# Patient Record
Sex: MALE | Race: WHITE | URBAN - METROPOLITAN AREA
[De-identification: names, ages, dates, MRNs, and addresses within clinical notes are randomized per-mention and may not be internally consistent; named-entity substitution may affect disease eponyms.]

---

## 2023-03-24 ENCOUNTER — OFFICE VISIT (OUTPATIENT)
Dept: INTERNAL MEDICINE CLINIC | Facility: CLINIC | Age: 63
End: 2023-03-24

## 2023-03-24 VITALS
BODY MASS INDEX: 33.62 KG/M2 | SYSTOLIC BLOOD PRESSURE: 155 MMHG | DIASTOLIC BLOOD PRESSURE: 85 MMHG | HEIGHT: 68 IN | WEIGHT: 221.81 LBS | HEART RATE: 73 BPM

## 2023-03-24 DIAGNOSIS — E11.9 TYPE 2 DIABETES MELLITUS WITHOUT COMPLICATION, WITHOUT LONG-TERM CURRENT USE OF INSULIN (HCC): ICD-10-CM

## 2023-03-24 DIAGNOSIS — Z12.5 PROSTATE CANCER SCREENING: ICD-10-CM

## 2023-03-24 DIAGNOSIS — R06.09 DYSPNEA ON EXERTION: ICD-10-CM

## 2023-03-24 DIAGNOSIS — E04.1 THYROID NODULE: ICD-10-CM

## 2023-03-24 DIAGNOSIS — Z00.00 PHYSICAL EXAM, ANNUAL: Primary | ICD-10-CM

## 2023-03-24 DIAGNOSIS — K42.0 UMBILICAL HERNIA WITH OBSTRUCTION, WITHOUT GANGRENE: ICD-10-CM

## 2023-03-24 DIAGNOSIS — I10 PRIMARY HYPERTENSION: ICD-10-CM

## 2023-03-24 PROCEDURE — 3008F BODY MASS INDEX DOCD: CPT | Performed by: INTERNAL MEDICINE

## 2023-03-24 PROCEDURE — 99386 PREV VISIT NEW AGE 40-64: CPT | Performed by: INTERNAL MEDICINE

## 2023-03-24 PROCEDURE — 3077F SYST BP >= 140 MM HG: CPT | Performed by: INTERNAL MEDICINE

## 2023-03-24 PROCEDURE — 3079F DIAST BP 80-89 MM HG: CPT | Performed by: INTERNAL MEDICINE

## 2023-03-24 RX ORDER — LISINOPRIL 5 MG/1
5 TABLET ORAL DAILY
Qty: 90 TABLET | Refills: 0 | Status: SHIPPED | OUTPATIENT
Start: 2023-03-24

## 2023-04-04 ENCOUNTER — LAB ENCOUNTER (OUTPATIENT)
Dept: LAB | Age: 63
End: 2023-04-04
Attending: INTERNAL MEDICINE
Payer: MEDICAID

## 2023-04-04 DIAGNOSIS — Z12.5 PROSTATE CANCER SCREENING: ICD-10-CM

## 2023-04-04 DIAGNOSIS — Z00.00 PHYSICAL EXAM, ANNUAL: ICD-10-CM

## 2023-04-04 LAB
ALBUMIN SERPL-MCNC: 3.7 G/DL (ref 3.4–5)
ALBUMIN/GLOB SERPL: 0.8 {RATIO} (ref 1–2)
ALP LIVER SERPL-CCNC: 62 U/L
ALT SERPL-CCNC: 58 U/L
ANION GAP SERPL CALC-SCNC: 6 MMOL/L (ref 0–18)
AST SERPL-CCNC: 35 U/L (ref 15–37)
BASOPHILS # BLD AUTO: 0.04 X10(3) UL (ref 0–0.2)
BASOPHILS NFR BLD AUTO: 0.7 %
BILIRUB SERPL-MCNC: 0.6 MG/DL (ref 0.1–2)
BUN BLD-MCNC: 21 MG/DL (ref 7–18)
BUN/CREAT SERPL: 20.8 (ref 10–20)
CALCIUM BLD-MCNC: 9.8 MG/DL (ref 8.5–10.1)
CHLORIDE SERPL-SCNC: 106 MMOL/L (ref 98–112)
CHOLEST SERPL-MCNC: 220 MG/DL (ref ?–200)
CO2 SERPL-SCNC: 26 MMOL/L (ref 21–32)
COMPLEXED PSA SERPL-MCNC: 0.44 NG/ML (ref ?–4)
CREAT BLD-MCNC: 1.01 MG/DL
CREAT UR-SCNC: 117 MG/DL
DEPRECATED RDW RBC AUTO: 41.3 FL (ref 35.1–46.3)
EOSINOPHIL # BLD AUTO: 0.08 X10(3) UL (ref 0–0.7)
EOSINOPHIL NFR BLD AUTO: 1.3 %
ERYTHROCYTE [DISTWIDTH] IN BLOOD BY AUTOMATED COUNT: 11.9 % (ref 11–15)
EST. AVERAGE GLUCOSE BLD GHB EST-MCNC: 154 MG/DL (ref 68–126)
FASTING PATIENT LIPID ANSWER: YES
FASTING STATUS PATIENT QL REPORTED: YES
GFR SERPLBLD BASED ON 1.73 SQ M-ARVRAT: 85 ML/MIN/1.73M2 (ref 60–?)
GLOBULIN PLAS-MCNC: 4.6 G/DL (ref 2.8–4.4)
GLUCOSE BLD-MCNC: 141 MG/DL (ref 70–99)
HBA1C MFR BLD: 7 % (ref ?–5.7)
HCT VFR BLD AUTO: 45.4 %
HDLC SERPL-MCNC: 39 MG/DL (ref 40–59)
HGB BLD-MCNC: 15.4 G/DL
IMM GRANULOCYTES # BLD AUTO: 0.01 X10(3) UL (ref 0–1)
IMM GRANULOCYTES NFR BLD: 0.2 %
LDLC SERPL CALC-MCNC: 98 MG/DL (ref ?–100)
LYMPHOCYTES # BLD AUTO: 2.4 X10(3) UL (ref 1–4)
LYMPHOCYTES NFR BLD AUTO: 39.5 %
MCH RBC QN AUTO: 31.8 PG (ref 26–34)
MCHC RBC AUTO-ENTMCNC: 33.9 G/DL (ref 31–37)
MCV RBC AUTO: 93.8 FL
MICROALBUMIN UR-MCNC: 4.28 MG/DL
MICROALBUMIN/CREAT 24H UR-RTO: 36.6 UG/MG (ref ?–30)
MONOCYTES # BLD AUTO: 0.58 X10(3) UL (ref 0.1–1)
MONOCYTES NFR BLD AUTO: 9.6 %
NEUTROPHILS # BLD AUTO: 2.96 X10 (3) UL (ref 1.5–7.7)
NEUTROPHILS # BLD AUTO: 2.96 X10(3) UL (ref 1.5–7.7)
NEUTROPHILS NFR BLD AUTO: 48.7 %
NONHDLC SERPL-MCNC: 181 MG/DL (ref ?–130)
OSMOLALITY SERPL CALC.SUM OF ELEC: 291 MOSM/KG (ref 275–295)
PLATELET # BLD AUTO: 273 10(3)UL (ref 150–450)
POTASSIUM SERPL-SCNC: 3.8 MMOL/L (ref 3.5–5.1)
PROT SERPL-MCNC: 8.3 G/DL (ref 6.4–8.2)
RBC # BLD AUTO: 4.84 X10(6)UL
SODIUM SERPL-SCNC: 138 MMOL/L (ref 136–145)
T4 FREE SERPL-MCNC: 0.9 NG/DL (ref 0.8–1.7)
TRIGL SERPL-MCNC: 496 MG/DL (ref 30–149)
TSI SER-ACNC: 3.88 MIU/ML (ref 0.36–3.74)
VLDLC SERPL CALC-MCNC: 85 MG/DL (ref 0–30)
WBC # BLD AUTO: 6.1 X10(3) UL (ref 4–11)

## 2023-04-04 PROCEDURE — 83036 HEMOGLOBIN GLYCOSYLATED A1C: CPT

## 2023-04-04 PROCEDURE — 85025 COMPLETE CBC W/AUTO DIFF WBC: CPT

## 2023-04-04 PROCEDURE — 80061 LIPID PANEL: CPT

## 2023-04-04 PROCEDURE — 3060F POS MICROALBUMINURIA REV: CPT | Performed by: INTERNAL MEDICINE

## 2023-04-04 PROCEDURE — 36415 COLL VENOUS BLD VENIPUNCTURE: CPT

## 2023-04-04 PROCEDURE — 84443 ASSAY THYROID STIM HORMONE: CPT

## 2023-04-04 PROCEDURE — 82043 UR ALBUMIN QUANTITATIVE: CPT

## 2023-04-04 PROCEDURE — 84439 ASSAY OF FREE THYROXINE: CPT

## 2023-04-04 PROCEDURE — 80053 COMPREHEN METABOLIC PANEL: CPT

## 2023-04-04 PROCEDURE — 82570 ASSAY OF URINE CREATININE: CPT

## 2023-04-04 PROCEDURE — 3061F NEG MICROALBUMINURIA REV: CPT | Performed by: INTERNAL MEDICINE

## 2023-04-04 PROCEDURE — 3051F HG A1C>EQUAL 7.0%<8.0%: CPT | Performed by: INTERNAL MEDICINE

## 2023-04-25 ENCOUNTER — HOSPITAL ENCOUNTER (OUTPATIENT)
Dept: CV DIAGNOSTICS | Facility: HOSPITAL | Age: 63
Discharge: HOME | End: 2023-04-25
Attending: INTERNAL MEDICINE
Payer: MEDICAID

## 2023-04-25 DIAGNOSIS — R06.09 DYSPNEA ON EXERTION: ICD-10-CM

## 2023-04-25 PROCEDURE — 93306 TTE W/DOPPLER COMPLETE: CPT | Performed by: INTERNAL MEDICINE

## 2023-04-27 ENCOUNTER — EKG ENCOUNTER (OUTPATIENT)
Dept: LAB | Age: 63
End: 2023-04-27
Attending: INTERNAL MEDICINE
Payer: MEDICAID

## 2023-04-27 ENCOUNTER — HOSPITAL ENCOUNTER (OUTPATIENT)
Dept: ULTRASOUND IMAGING | Age: 63
Discharge: HOME | End: 2023-04-27
Attending: INTERNAL MEDICINE
Payer: MEDICAID

## 2023-04-27 ENCOUNTER — HOSPITAL ENCOUNTER (OUTPATIENT)
Dept: GENERAL RADIOLOGY | Age: 63
Discharge: HOME | End: 2023-04-27
Attending: INTERNAL MEDICINE
Payer: MEDICAID

## 2023-04-27 DIAGNOSIS — I10 PRIMARY HYPERTENSION: ICD-10-CM

## 2023-04-27 DIAGNOSIS — E04.1 THYROID NODULE: ICD-10-CM

## 2023-04-27 DIAGNOSIS — R06.09 DYSPNEA ON EXERTION: ICD-10-CM

## 2023-04-27 LAB
ATRIAL RATE: 69 BPM
P AXIS: 64 DEGREES
P-R INTERVAL: 166 MS
Q-T INTERVAL: 398 MS
QRS DURATION: 94 MS
QTC CALCULATION (BEZET): 426 MS
R AXIS: -6 DEGREES
T AXIS: 0 DEGREES
VENTRICULAR RATE: 69 BPM

## 2023-04-27 PROCEDURE — 93010 ELECTROCARDIOGRAM REPORT: CPT | Performed by: INTERNAL MEDICINE

## 2023-04-27 PROCEDURE — 76536 US EXAM OF HEAD AND NECK: CPT | Performed by: INTERNAL MEDICINE

## 2023-04-27 PROCEDURE — 71046 X-RAY EXAM CHEST 2 VIEWS: CPT | Performed by: INTERNAL MEDICINE

## 2023-04-27 PROCEDURE — 93005 ELECTROCARDIOGRAM TRACING: CPT

## 2023-04-29 ENCOUNTER — TELEPHONE (OUTPATIENT)
Dept: INTERNAL MEDICINE CLINIC | Facility: CLINIC | Age: 63
End: 2023-04-29

## 2023-06-01 ENCOUNTER — TELEPHONE (OUTPATIENT)
Dept: INTERNAL MEDICINE CLINIC | Facility: CLINIC | Age: 63
End: 2023-06-01

## 2023-06-01 NOTE — TELEPHONE ENCOUNTER
Spoke to daughter and patient reviewed yesterday's visit note in [facility] with Dr. Rodrigo Hodge. Patient had stress test which showed reversible ischemia in the apical region of the heart. Cardiologist recommended angiography. Reviewed with patient what angiography means test to evaluate coronary arteries. And if anything abnormal found can be stented or more serious problem surgery would be recommended. Also advised that patient should be taking cholesterol-lowering medication, he agreed we will start him on the atorvastatin 20 mg daily. Recommended that patient will need to have follow-up labs in 2 months after starting atorvastatin. Also advised him to hold metformin for 2 days prior to angiography, and stay well-hydrated. Requested that patient comes for follow-up after completion of the testing.

## 2023-06-01 NOTE — TELEPHONE ENCOUNTER
Patient's daughter called that they did cardiologist consultation and the notes were suppose to be faxed over to the office.  Daughter stated that cardiologist recommended surgery and she wants to speak with Dr first

## 2023-06-03 ENCOUNTER — LAB ENCOUNTER (OUTPATIENT)
Dept: LAB | Age: 63
End: 2023-06-03
Attending: INTERNAL MEDICINE
Payer: MEDICAID

## 2023-06-03 DIAGNOSIS — Z01.818 PRE-OP TESTING: Primary | ICD-10-CM

## 2023-06-03 LAB
ANION GAP SERPL CALC-SCNC: 4 MMOL/L (ref 0–18)
BASOPHILS # BLD AUTO: 0.03 X10(3) UL (ref 0–0.2)
BASOPHILS NFR BLD AUTO: 0.5 %
BUN BLD-MCNC: 22 MG/DL (ref 7–18)
BUN/CREAT SERPL: 22.9 (ref 10–20)
CALCIUM BLD-MCNC: 9.7 MG/DL (ref 8.5–10.1)
CHLORIDE SERPL-SCNC: 107 MMOL/L (ref 98–112)
CO2 SERPL-SCNC: 28 MMOL/L (ref 21–32)
CREAT BLD-MCNC: 0.96 MG/DL
DEPRECATED RDW RBC AUTO: 43 FL (ref 35.1–46.3)
EOSINOPHIL # BLD AUTO: 0.11 X10(3) UL (ref 0–0.7)
EOSINOPHIL NFR BLD AUTO: 1.8 %
ERYTHROCYTE [DISTWIDTH] IN BLOOD BY AUTOMATED COUNT: 12.5 % (ref 11–15)
FASTING STATUS PATIENT QL REPORTED: NO
GFR SERPLBLD BASED ON 1.73 SQ M-ARVRAT: 90 ML/MIN/1.73M2 (ref 60–?)
GLUCOSE BLD-MCNC: 113 MG/DL (ref 70–99)
HCT VFR BLD AUTO: 44.8 %
HGB BLD-MCNC: 14.9 G/DL
IMM GRANULOCYTES # BLD AUTO: 0.02 X10(3) UL (ref 0–1)
IMM GRANULOCYTES NFR BLD: 0.3 %
INR BLD: 0.98 (ref 0.85–1.16)
LYMPHOCYTES # BLD AUTO: 2.21 X10(3) UL (ref 1–4)
LYMPHOCYTES NFR BLD AUTO: 35.2 %
MCH RBC QN AUTO: 31.3 PG (ref 26–34)
MCHC RBC AUTO-ENTMCNC: 33.3 G/DL (ref 31–37)
MCV RBC AUTO: 94.1 FL
MONOCYTES # BLD AUTO: 0.57 X10(3) UL (ref 0.1–1)
MONOCYTES NFR BLD AUTO: 9.1 %
NEUTROPHILS # BLD AUTO: 3.34 X10 (3) UL (ref 1.5–7.7)
NEUTROPHILS # BLD AUTO: 3.34 X10(3) UL (ref 1.5–7.7)
NEUTROPHILS NFR BLD AUTO: 53.1 %
OSMOLALITY SERPL CALC.SUM OF ELEC: 292 MOSM/KG (ref 275–295)
PLATELET # BLD AUTO: 258 10(3)UL (ref 150–450)
POTASSIUM SERPL-SCNC: 4 MMOL/L (ref 3.5–5.1)
PROTHROMBIN TIME: 12.9 SECONDS (ref 11.6–14.8)
RBC # BLD AUTO: 4.76 X10(6)UL
SODIUM SERPL-SCNC: 139 MMOL/L (ref 136–145)
WBC # BLD AUTO: 6.3 X10(3) UL (ref 4–11)

## 2023-06-03 PROCEDURE — 36415 COLL VENOUS BLD VENIPUNCTURE: CPT

## 2023-06-03 PROCEDURE — 85025 COMPLETE CBC W/AUTO DIFF WBC: CPT

## 2023-06-03 PROCEDURE — 80048 BASIC METABOLIC PNL TOTAL CA: CPT

## 2023-06-03 PROCEDURE — 85610 PROTHROMBIN TIME: CPT

## 2023-06-05 RX ORDER — ASPIRIN 81 MG/1
324 TABLET, CHEWABLE ORAL ONCE
OUTPATIENT
Start: 2023-06-05 | End: 2023-06-05

## 2023-06-05 RX ORDER — SODIUM CHLORIDE 9 MG/ML
125 INJECTION, SOLUTION INTRAVENOUS ONCE
OUTPATIENT
Start: 2023-06-05

## 2023-06-08 ENCOUNTER — PATIENT MESSAGE (OUTPATIENT)
Dept: INTERNAL MEDICINE CLINIC | Facility: CLINIC | Age: 63
End: 2023-06-08

## 2023-06-09 ENCOUNTER — HOSPITAL ENCOUNTER (OUTPATIENT)
Dept: INTERVENTIONAL RADIOLOGY/VASCULAR | Facility: HOSPITAL | Age: 63
Discharge: HOME | End: 2023-06-09
Attending: INTERNAL MEDICINE
Payer: MEDICAID

## 2023-06-12 NOTE — TELEPHONE ENCOUNTER
Anjali Downs (daughter-LAURA) called back, wants to check if the new medication was sent to the pharmacy  , informed that SITagliptin Phosphate 100 MG Oral Tab  was already  sent on 6/9/23 to [facility]. Daughter is also requesting test strips refill, patient check his blood sugar once a day. Pended prescription, routed to RN triage to run for protocol , thanks.

## 2023-06-13 ENCOUNTER — HOSPITAL ENCOUNTER (OUTPATIENT)
Dept: INTERVENTIONAL RADIOLOGY/VASCULAR | Facility: HOSPITAL | Age: 63
Discharge: HOME | End: 2023-06-13
Attending: INTERNAL MEDICINE | Admitting: INTERNAL MEDICINE
Payer: MEDICAID

## 2023-06-13 VITALS
SYSTOLIC BLOOD PRESSURE: 127 MMHG | HEIGHT: 68 IN | TEMPERATURE: 98 F | RESPIRATION RATE: 29 BRPM | BODY MASS INDEX: 32.89 KG/M2 | HEART RATE: 70 BPM | WEIGHT: 217 LBS | OXYGEN SATURATION: 93 % | DIASTOLIC BLOOD PRESSURE: 92 MMHG

## 2023-06-13 DIAGNOSIS — R06.02 SOB (SHORTNESS OF BREATH): ICD-10-CM

## 2023-06-13 DIAGNOSIS — R94.39 ABNORMAL STRESS TEST: ICD-10-CM

## 2023-06-13 LAB — GLUCOSE BLDC GLUCOMTR-MCNC: 170 MG/DL (ref 70–99)

## 2023-06-13 PROCEDURE — B2111ZZ FLUOROSCOPY OF MULTIPLE CORONARY ARTERIES USING LOW OSMOLAR CONTRAST: ICD-10-PCS | Performed by: INTERNAL MEDICINE

## 2023-06-13 PROCEDURE — 99152 MOD SED SAME PHYS/QHP 5/>YRS: CPT | Performed by: INTERNAL MEDICINE

## 2023-06-13 PROCEDURE — 92978 ENDOLUMINL IVUS OCT C 1ST: CPT | Performed by: INTERNAL MEDICINE

## 2023-06-13 PROCEDURE — 93458 L HRT ARTERY/VENTRICLE ANGIO: CPT | Performed by: INTERNAL MEDICINE

## 2023-06-13 PROCEDURE — 99153 MOD SED SAME PHYS/QHP EA: CPT | Performed by: INTERNAL MEDICINE

## 2023-06-13 PROCEDURE — 4A023N8 MEASUREMENT OF CARDIAC SAMPLING AND PRESSURE, BILATERAL, PERCUTANEOUS APPROACH: ICD-10-PCS | Performed by: INTERNAL MEDICINE

## 2023-06-13 PROCEDURE — 82962 GLUCOSE BLOOD TEST: CPT

## 2023-06-13 PROCEDURE — B240ZZ3 ULTRASONOGRAPHY OF SINGLE CORONARY ARTERY, INTRAVASCULAR: ICD-10-PCS | Performed by: INTERNAL MEDICINE

## 2023-06-13 RX ORDER — ASPIRIN 81 MG/1
324 TABLET, CHEWABLE ORAL ONCE
Status: DISCONTINUED | OUTPATIENT
Start: 2023-06-13 | End: 2023-06-13

## 2023-06-13 RX ORDER — SODIUM CHLORIDE 9 MG/ML
125 INJECTION, SOLUTION INTRAVENOUS ONCE
Status: COMPLETED | OUTPATIENT
Start: 2023-06-13 | End: 2023-06-13

## 2023-06-13 RX ORDER — HEPARIN SODIUM 1000 [USP'U]/ML
INJECTION, SOLUTION INTRAVENOUS; SUBCUTANEOUS
Status: COMPLETED
Start: 2023-06-13 | End: 2023-06-13

## 2023-06-13 RX ORDER — VERAPAMIL HYDROCHLORIDE 2.5 MG/ML
INJECTION, SOLUTION INTRAVENOUS
Status: COMPLETED
Start: 2023-06-13 | End: 2023-06-13

## 2023-06-13 RX ORDER — ASPIRIN 81 MG/1
324 TABLET, CHEWABLE ORAL ONCE
Status: ON HOLD | COMMUNITY
End: 2023-06-13

## 2023-06-13 RX ORDER — ASPIRIN 81 MG/1
TABLET, CHEWABLE ORAL
Status: COMPLETED
Start: 2023-06-13 | End: 2023-06-13

## 2023-06-13 RX ORDER — NITROGLYCERIN 20 MG/100ML
INJECTION INTRAVENOUS
Status: COMPLETED
Start: 2023-06-13 | End: 2023-06-13

## 2023-06-13 RX ORDER — MIDAZOLAM HYDROCHLORIDE 1 MG/ML
INJECTION INTRAMUSCULAR; INTRAVENOUS
Status: COMPLETED
Start: 2023-06-13 | End: 2023-06-13

## 2023-06-13 RX ORDER — ASPIRIN 81 MG/1
81 TABLET, CHEWABLE ORAL DAILY
Qty: 1 TABLET | Refills: 0 | Status: SHIPPED | OUTPATIENT
Start: 2023-06-13

## 2023-06-13 RX ORDER — LIDOCAINE HYDROCHLORIDE 20 MG/ML
INJECTION, SOLUTION EPIDURAL; INFILTRATION; INTRACAUDAL; PERINEURAL
Status: COMPLETED
Start: 2023-06-13 | End: 2023-06-13

## 2023-06-13 RX ORDER — BLOOD SUGAR DIAGNOSTIC
1 STRIP MISCELLANEOUS DAILY
Qty: 100 EACH | Refills: 3 | Status: SHIPPED | OUTPATIENT
Start: 2023-06-13

## 2023-06-13 RX ADMIN — SODIUM CHLORIDE 125 ML/HR: 9 INJECTION, SOLUTION INTRAVENOUS at 07:20:00

## 2023-06-13 RX ADMIN — ASPIRIN 324 MG: 81 TABLET, CHEWABLE ORAL at 07:30:00

## 2023-06-13 NOTE — DISCHARGE INSTRUCTIONS
Transradial Angiogram Discharge Instructions    The following instructions are for patient who have had an angiogram, cardiac cath, angioplasty, or stent through the radial artery. Proper skin puncture site care is important during the healing period. This guideline should help to remind you of the verbal instructions you received from your physician or nurse. Site: right Wrist    Site Care: For 5 days after the procedure, make sure the wrist is not submerged in water or any liquid. Leave bandage in place for 24 hours. Then, gently wash with soap and water. Do no put any other bandage, ointment, powders, or creams to the site. For local swelling: apply ice  If bleeding occurs, elevate the hand above the heart and apply local pressure    Activity/Driving:  Avoid wrist flexion, extension, and fine motor activities (i.e. Texting, typing, using a computer mouse, etc.) for 24 hours. Do not drive for 24 hours. Do not lift or pull anything heavier than 10 pounnds with affected hand for 1 week. Additional Instructions:  Do not take glucophage/metformin containing products for at least 48 hours after procedure, unless otherwise directed by your physician. When to contact your physician  Call Dr. Zahira Wilson at [phone] right away if you experience any of the following:    Swelling, pain, or bleeding at the site that is not relieved by applying ice or pressure  Signs of infection: redness, warmth, drainage at the site, chills, or temperature of 100.5 degrees or greater.   Changes in sensation, numbness, or tingling of affected hand

## 2023-06-13 NOTE — INTERVAL H&P NOTE
Pre-op Diagnosis: * No pre-op diagnosis entered *    The above referenced H&P was reviewed by Agustin Smart MD on 6/13/2023, the patient was examined and no significant changes have occurred in the patient's condition since the H&P was performed. I discussed with the patient and/or legal representative the potential benefits, risks and side effects of this procedure; the likelihood of the patient achieving goals; and potential problems that might occur during recuperation. I discussed reasonable alternatives to the procedure, including risks, benefits and side effects related to the alternatives and risks related to not receiving this procedure. We will proceed with procedure as planned.

## 2023-06-13 NOTE — IVS NOTE
DISCHARGE NOTE    1, PATIENT AWAKE AND ALERT X 4    2. MIX, VSS, NO PONV, NO PAIN    3. INSTRUCTIONS GIVEN TO PATIENT AND FAMILY    4. IV ACCESS WAS REMOVED BEFORE DISCHARGE    5. DR. Richmond        SPOKE WITH PATIENT AND FAMILY POST PROCEDURE    6. PATIENT ABLE TO DRINK, [address], VOID    7. PROCEDURAL SITE REMAINS DRY, INTACT WITH GOOD CIRCULATION,    MOVEMENT AND SENSATION    8. FOLLOW UP APPOINTMENT WITH Basilio    9. PATIENT DISCHARGED VIA WHEEL CHAIR TO [address] [place] [address]    10.  NEW PRESCRIPTION: [id number] mg

## 2023-06-13 NOTE — TELEPHONE ENCOUNTER
Please review refill failed/no protocol - diabetic supplies never sent - patient established care recently     Requested Prescriptions     Pending Prescriptions Disp Refills    Glucose Blood (ONETOUCH ULTRA) In Vitro Strip 100 each 3     Si each by Other route daily. Recent Visits  Date Type Provider Dept   23 Office Visit Claire Araya MD [facility]   Showing recent visits within past 540 days with a meds authorizing provider and meeting all other requirements  Future Appointments  No visits were found meeting these conditions. Showing future appointments within next 150 days with a meds authorizing provider and meeting all other requirements    Requested Prescriptions   Pending Prescriptions Disp Refills    Glucose Blood (ONETOUCH ULTRA) In Vitro Strip 100 each 3     Si each by Other route daily.        Diabetic Supplies Protocol Passed - 2023  1:08 PM        Passed - In person appointment or virtual visit in the past 12 mos or appointment in next 3 mos     Recent Outpatient Visits              2 months ago Physical exam, annual    [address], [address], Steve Lutz MD    Office Visit

## 2023-06-23 ENCOUNTER — TELEPHONE (OUTPATIENT)
Dept: INTERNAL MEDICINE CLINIC | Facility: CLINIC | Age: 63
End: 2023-06-23

## 2023-06-23 DIAGNOSIS — E11.9 TYPE 2 DIABETES MELLITUS WITHOUT COMPLICATION, WITHOUT LONG-TERM CURRENT USE OF INSULIN (HCC): Primary | ICD-10-CM

## 2023-07-05 ENCOUNTER — OFFICE VISIT (OUTPATIENT)
Dept: INTERNAL MEDICINE CLINIC | Facility: CLINIC | Age: 63
End: 2023-07-05

## 2023-07-05 VITALS
TEMPERATURE: 98 F | RESPIRATION RATE: 16 BRPM | BODY MASS INDEX: 32.92 KG/M2 | WEIGHT: 217.19 LBS | HEIGHT: 68 IN | HEART RATE: 97 BPM | SYSTOLIC BLOOD PRESSURE: 125 MMHG | DIASTOLIC BLOOD PRESSURE: 83 MMHG

## 2023-07-05 DIAGNOSIS — E11.9 TYPE 2 DIABETES MELLITUS WITHOUT COMPLICATION, WITHOUT LONG-TERM CURRENT USE OF INSULIN (HCC): Primary | ICD-10-CM

## 2023-07-05 DIAGNOSIS — M25.571 ARTHRALGIA OF BOTH ANKLES: ICD-10-CM

## 2023-07-05 DIAGNOSIS — M25.572 ARTHRALGIA OF BOTH ANKLES: ICD-10-CM

## 2023-07-05 DIAGNOSIS — Z12.11 COLON CANCER SCREENING: ICD-10-CM

## 2023-07-05 DIAGNOSIS — E78.49 OTHER HYPERLIPIDEMIA: ICD-10-CM

## 2023-07-05 PROCEDURE — 3074F SYST BP LT 130 MM HG: CPT | Performed by: INTERNAL MEDICINE

## 2023-07-05 PROCEDURE — 99214 OFFICE O/P EST MOD 30 MIN: CPT | Performed by: INTERNAL MEDICINE

## 2023-07-05 PROCEDURE — 3079F DIAST BP 80-89 MM HG: CPT | Performed by: INTERNAL MEDICINE

## 2023-07-05 PROCEDURE — 3008F BODY MASS INDEX DOCD: CPT | Performed by: INTERNAL MEDICINE

## 2023-07-05 RX ORDER — EMPAGLIFLOZIN 25 MG/1
1 TABLET, FILM COATED ORAL DAILY
Qty: 30 TABLET | Refills: 5 | Status: SHIPPED | OUTPATIENT
Start: 2023-07-05

## 2023-08-09 RX ORDER — EMPAGLIFLOZIN 25 MG/1
1 TABLET, FILM COATED ORAL DAILY
Qty: 90 TABLET | Refills: 2 | Status: SHIPPED | OUTPATIENT
Start: 2023-08-09

## 2023-08-09 RX ORDER — LISINOPRIL 5 MG/1
5 TABLET ORAL DAILY
Qty: 90 TABLET | Refills: 2 | Status: SHIPPED | OUTPATIENT
Start: 2023-08-09

## 2023-08-09 RX ORDER — BLOOD SUGAR DIAGNOSTIC
1 STRIP MISCELLANEOUS DAILY
Qty: 100 EACH | Refills: 3 | Status: SHIPPED | OUTPATIENT
Start: 2023-08-09

## 2023-08-09 NOTE — TELEPHONE ENCOUNTER
Refill passed per [facility] [place] protocol. Requested Prescriptions   Pending Prescriptions Disp Refills    lisinopril 5 MG Oral Tab 90 tablet 0     Sig: Take 1 tablet (5 mg total) by mouth daily. Hypertensive Medications Protocol Passed - 8/8/2023  6:03 PM        Passed - In person appointment in the past 12 or next 3 months     Recent Outpatient Visits              1 month ago Type 2 diabetes mellitus without complication, without long-term current use of insulin ([address]    James Mcduffie Gwynneth Bearded, MD    Office Visit    4 months ago Physical exam, annual    James Mcduffie Gwynneth Bearded, MD    Office Visit          Future Appointments         Provider Department Appt Notes    In 2 months Kylee Garcia MD [facility], [address], [place] athralgia of both ankles, policy informed               Passed - Last BP reading less than 140/90     BP Readings from Last 1 Encounters:  07/05/23 : 125/83              Passed - CMP or BMP in past 6 months     Recent Results (from the past 4392 hour(s))   BASIC METABOLIC PANEL (8)    Collection Time: 06/03/23 11:21 AM   Result Value Ref Range    Glucose 113 (H) 70 - 99 mg/dL    Sodium 139 136 - 145 mmol/L    Potassium 4.0 3.5 - 5.1 mmol/L    Chloride 107 98 - 112 mmol/L    CO2 28.0 21.0 - 32.0 mmol/L    Anion Gap 4 0 - 18 mmol/L    BUN 22 (H) 7 - 18 mg/dL    Creatinine 0.96 0.70 - 1.30 mg/dL    BUN/CREA Ratio 22.9 (H) 10.0 - 20.0    Calcium, Total 9.7 8.5 - 10.1 mg/dL    Calculated Osmolality 292 275 - 295 mOsm/kg    eGFR-Cr 90 >=60 mL/min/1.73m2    Patient Fasting for BMP? No      *Note: Due to a large number of results and/or encounters for the requested time period, some results have not been displayed. A complete set of results can be found in Results Review.                Passed - In person appointment or virtual visit in the past 6 months     Recent Outpatient Visits              1 month ago Type 2 diabetes mellitus without complication, without long-term current use of insulin ([facility])    Cha Coelho [facility] Carmenza Rodriguez MD    Office Visit    4 months ago Physical exam, annual    James Mcduffie Gwynneth Bearded, MD    Office Visit          Future Appointments         Provider Department Appt Notes    In 2 months Kylee Garcia MD [facility], [address], [place] athralgia of both ankles, policy informed               Passed - EGFRCR or GFRNAA > 50     GFR Evaluation  EGFRCR: 90 , resulted on 6/3/2023            Empagliflozin (JARDIANCE) 25 MG Oral Tab 30 tablet 5     Sig: Take 1 tablet by mouth daily.        Diabetes Medication Protocol Passed - 8/8/2023  6:03 PM        Passed - Last A1C < 7.5 and within past 6 months     Lab Results   Component Value Date    A1C 7.0 (H) 04/04/2023             Passed - In person appointment or virtual visit in the past 6 mos or appointment in next 3 mos     Recent Outpatient Visits              1 month ago Type 2 diabetes mellitus without complication, without long-term current use of insulin ([facility]    Cha Coelho, [facility] Carmenza Fernando MD    Office Visit    4 months ago Physical exam, annual    James Mcduffie Gwynneth Bearded, MD    Office Visit          Future Appointments         Provider Department Appt Notes    In 2 months MD Александр [facility], [address], [place] athralgia of both ankles, policy informed               Passed - EGFRCR or GFRNAA > 50     GFR Evaluation  EGFRCR: 90 , resulted on 6/3/2023          Passed - GFR in the past 12 months

## 2023-08-09 NOTE — TELEPHONE ENCOUNTER
Refill passed per [place] [email or web address] [facility] protocol. Requested Prescriptions   Pending Prescriptions Disp Refills    Glucose Blood (ONETOUCH ULTRA) In Vitro Strip 100 each 3     Si each by Other route daily.        Diabetic Supplies Protocol Passed - 2023  6:03 PM        Passed - In person appointment or virtual visit in the past 12 mos or appointment in next 3 mos     Recent Outpatient Visits              1 month ago Type 2 diabetes mellitus without complication, without long-term current use of insulin ([facility]    [address], MD    Office Visit    4 months ago Physical exam, annual    [address], Kraig Mcknight MD    Office Visit          Future Appointments         Provider Department Appt Notes    In 2 months Radha Connor MD [address], [place] athralgia of both ankles, policy informed                    Recent Outpatient Visits              1 month ago Type 2 diabetes mellitus without complication, without long-term current use of insulin ([facility] [address]    [address], MD    Office Visit    4 months ago Physical exam, annual    [address], MD    Office Visit            Future Appointments         Provider Department Appt Notes    In 2 months Radha Connor MD [facility], [address], [place] athralgia of both ankles, policy informed

## 2023-09-11 RX ORDER — ATORVASTATIN CALCIUM 20 MG/1
20 TABLET, FILM COATED ORAL NIGHTLY
Qty: 90 TABLET | Refills: 3 | Status: SHIPPED | OUTPATIENT
Start: 2023-09-11

## 2023-09-11 NOTE — TELEPHONE ENCOUNTER
Refill passed per [place] [employer] protocol. Requested Prescriptions   Pending Prescriptions Disp Refills    atorvastatin 20 MG Oral Tab 90 tablet 0     Sig: Take 1 tablet (20 mg total) by mouth nightly.        Cholesterol Medication Protocol Passed - 9/11/2023  8:33 AM        Passed - ALT in past 12 months        Passed - LDL in past 12 months        Passed - Last ALT < 80     Lab Results   Component Value Date    ALT 58 04/04/2023             Passed - Last LDL < 130     Lab Results   Component Value Date    LDL 98 04/04/2023             Passed - In person appointment or virtual visit in the past 12 mos or appointment in next 3 mos     Recent Outpatient Visits              2 months ago Type 2 diabetes mellitus without complication, without long-term current use of insulin ([address]    [address],[place] [address], [address] [place] [address], Shelly Barboza MD    Office Visit    5 months ago Physical exam, annual    [address], Shelly Barboza MD    Office Visit          Future Appointments         Provider Department Appt Notes    In 1 month Lopez Ge MD [address], [address], [place] athralgia of both ankles, policy informed                     Future Appointments         Provider Department Appt Notes    In 1 month Lopez Ge MD [address], [address], [place] athralgia of both ankles, policy informed            Recent Outpatient Visits              2 months ago Type 2 diabetes mellitus without complication, without long-term current use of insulin ([address]    [address] [facility], Shelly Barboza MD    Office Visit    5 months ago Physical exam, annual    [address] [facility], Shelly Barboza MD    Office Visit

## 2023-09-20 NOTE — TELEPHONE ENCOUNTER
Refill passed per [facility] protocol. Requested Prescriptions   Pending Prescriptions Disp Refills    SITagliptin Phosphate 100 MG Oral Tab 90 tablet 1     Sig: Take 1 tablet (100 mg total) by mouth daily.        Diabetes Medication Protocol Passed - 9/19/2023  3:20 PM        Passed - Last A1C < 7.5 and within past 6 months     Lab Results   Component Value Date    A1C 7.0 (H) 04/04/2023             Passed - In person appointment or virtual visit in the past 6 mos or appointment in next 3 mos     Recent Outpatient Visits              2 months ago Type 2 diabetes mellitus without complication, without long-term current use of insulin ([address]    [facility]    Office Visit    6 months ago Physical exam, annual    James Lentz Rabon Cower, MD    Office Visit          Future Appointments         Provider Department Appt Notes    In 2 weeks Surjit Mott MD [facility], [address], [place] athralgia of both ankles, policy informed                    Passed - EGFRCR or GFRNAA > 50     GFR Evaluation  EGFRCR: 90 , resulted on 6/3/2023          Passed - GFR in the past 12 months              Future Appointments         Provider Department Appt Notes    In 2 weeks MD Indira Barlow [address], [place] athralgia of both ankles, policy informed            Recent Outpatient Visits              2 months ago Type 2 diabetes mellitus without complication, without long-term current use of insulin ([address]    [address]ent[address], [place] David Wang MD    Office Visit    6 months ago Physical exam, annual    James Lentz Kelly [place], [place], MD    Office Visit

## 2023-10-05 ENCOUNTER — OFFICE VISIT (OUTPATIENT)
Dept: RHEUMATOLOGY | Facility: CLINIC | Age: 63
End: 2023-10-05

## 2023-10-05 ENCOUNTER — LAB ENCOUNTER (OUTPATIENT)
Dept: LAB | Age: 63
End: 2023-10-05
Attending: INTERNAL MEDICINE
Payer: MEDICAID

## 2023-10-05 VITALS
RESPIRATION RATE: 16 BRPM | WEIGHT: 203.5 LBS | HEART RATE: 81 BPM | SYSTOLIC BLOOD PRESSURE: 110 MMHG | BODY MASS INDEX: 30.84 KG/M2 | HEIGHT: 68 IN | DIASTOLIC BLOOD PRESSURE: 65 MMHG

## 2023-10-05 DIAGNOSIS — M79.10 MYALGIA: ICD-10-CM

## 2023-10-05 DIAGNOSIS — M25.571 ARTHRALGIA OF BOTH ANKLES: ICD-10-CM

## 2023-10-05 DIAGNOSIS — E78.49 OTHER HYPERLIPIDEMIA: ICD-10-CM

## 2023-10-05 DIAGNOSIS — M25.50 POLYARTHRALGIA: Primary | ICD-10-CM

## 2023-10-05 DIAGNOSIS — M25.572 ARTHRALGIA OF BOTH ANKLES: ICD-10-CM

## 2023-10-05 DIAGNOSIS — M25.50 POLYARTHRALGIA: ICD-10-CM

## 2023-10-05 DIAGNOSIS — E11.9 TYPE 2 DIABETES MELLITUS WITHOUT COMPLICATION, WITHOUT LONG-TERM CURRENT USE OF INSULIN (HCC): ICD-10-CM

## 2023-10-05 DIAGNOSIS — M11.9 CRYSTAL ARTHROPATHY OF ANKLE AND FOOT: ICD-10-CM

## 2023-10-05 LAB
ALBUMIN SERPL-MCNC: 3.9 G/DL (ref 3.4–5)
ALBUMIN/GLOB SERPL: 0.8 {RATIO} (ref 1–2)
ALP LIVER SERPL-CCNC: 65 U/L
ALT SERPL-CCNC: 51 U/L
ANION GAP SERPL CALC-SCNC: 7 MMOL/L (ref 0–18)
AST SERPL-CCNC: 51 U/L (ref 15–37)
BASOPHILS # BLD AUTO: 0.04 X10(3) UL (ref 0–0.2)
BASOPHILS NFR BLD AUTO: 0.5 %
BILIRUB SERPL-MCNC: 0.7 MG/DL (ref 0.1–2)
BUN BLD-MCNC: 24 MG/DL (ref 7–18)
BUN/CREAT SERPL: 20 (ref 10–20)
CALCIUM BLD-MCNC: 9.4 MG/DL (ref 8.5–10.1)
CHLORIDE SERPL-SCNC: 107 MMOL/L (ref 98–112)
CHOLEST SERPL-MCNC: 156 MG/DL (ref ?–200)
CK SERPL-CCNC: 1198 U/L
CO2 SERPL-SCNC: 25 MMOL/L (ref 21–32)
CREAT BLD-MCNC: 1.2 MG/DL
CRP SERPL-MCNC: 0.64 MG/DL (ref ?–0.3)
DEPRECATED RDW RBC AUTO: 39.6 FL (ref 35.1–46.3)
EGFRCR SERPLBLD CKD-EPI 2021: 69 ML/MIN/1.73M2 (ref 60–?)
EOSINOPHIL # BLD AUTO: 0.1 X10(3) UL (ref 0–0.7)
EOSINOPHIL NFR BLD AUTO: 1.3 %
ERYTHROCYTE [DISTWIDTH] IN BLOOD BY AUTOMATED COUNT: 12.1 % (ref 11–15)
ERYTHROCYTE [SEDIMENTATION RATE] IN BLOOD: 52 MM/HR
FASTING PATIENT LIPID ANSWER: NO
FASTING STATUS PATIENT QL REPORTED: NO
GLOBULIN PLAS-MCNC: 4.6 G/DL (ref 2.8–4.4)
GLUCOSE BLD-MCNC: 161 MG/DL (ref 70–99)
HCT VFR BLD AUTO: 46.9 %
HDLC SERPL-MCNC: 42 MG/DL (ref 40–59)
HGB BLD-MCNC: 15.8 G/DL
IMM GRANULOCYTES # BLD AUTO: 0.02 X10(3) UL (ref 0–1)
IMM GRANULOCYTES NFR BLD: 0.3 %
LDLC SERPL CALC-MCNC: 66 MG/DL (ref ?–100)
LYMPHOCYTES # BLD AUTO: 1.85 X10(3) UL (ref 1–4)
LYMPHOCYTES NFR BLD AUTO: 24.1 %
MCH RBC QN AUTO: 30 PG (ref 26–34)
MCHC RBC AUTO-ENTMCNC: 33.7 G/DL (ref 31–37)
MCV RBC AUTO: 89.2 FL
MONOCYTES # BLD AUTO: 0.64 X10(3) UL (ref 0.1–1)
MONOCYTES NFR BLD AUTO: 8.3 %
NEUTROPHILS # BLD AUTO: 5.02 X10 (3) UL (ref 1.5–7.7)
NEUTROPHILS # BLD AUTO: 5.02 X10(3) UL (ref 1.5–7.7)
NEUTROPHILS NFR BLD AUTO: 65.5 %
NONHDLC SERPL-MCNC: 114 MG/DL (ref ?–130)
OSMOLALITY SERPL CALC.SUM OF ELEC: 296 MOSM/KG (ref 275–295)
PLATELET # BLD AUTO: 260 10(3)UL (ref 150–450)
POTASSIUM SERPL-SCNC: 3.7 MMOL/L (ref 3.5–5.1)
PROT SERPL-MCNC: 8.5 G/DL (ref 6.4–8.2)
RBC # BLD AUTO: 5.26 X10(6)UL
RHEUMATOID FACT SERPL-ACNC: <10 IU/ML (ref ?–15)
SODIUM SERPL-SCNC: 139 MMOL/L (ref 136–145)
TRIGL SERPL-MCNC: 306 MG/DL (ref 30–149)
TSI SER-ACNC: 1.19 MIU/ML (ref 0.36–3.74)
URATE SERPL-MCNC: 6.3 MG/DL
VLDLC SERPL CALC-MCNC: 46 MG/DL (ref 0–30)
WBC # BLD AUTO: 7.7 X10(3) UL (ref 4–11)

## 2023-10-05 PROCEDURE — 82085 ASSAY OF ALDOLASE: CPT

## 2023-10-05 PROCEDURE — 80061 LIPID PANEL: CPT

## 2023-10-05 PROCEDURE — 85652 RBC SED RATE AUTOMATED: CPT

## 2023-10-05 PROCEDURE — 86431 RHEUMATOID FACTOR QUANT: CPT

## 2023-10-05 PROCEDURE — 86200 CCP ANTIBODY: CPT

## 2023-10-05 PROCEDURE — 86140 C-REACTIVE PROTEIN: CPT

## 2023-10-05 PROCEDURE — 36415 COLL VENOUS BLD VENIPUNCTURE: CPT

## 2023-10-05 PROCEDURE — 99244 OFF/OP CNSLTJ NEW/EST MOD 40: CPT | Performed by: INTERNAL MEDICINE

## 2023-10-05 PROCEDURE — 3074F SYST BP LT 130 MM HG: CPT | Performed by: INTERNAL MEDICINE

## 2023-10-05 PROCEDURE — 86038 ANTINUCLEAR ANTIBODIES: CPT

## 2023-10-05 PROCEDURE — 84443 ASSAY THYROID STIM HORMONE: CPT

## 2023-10-05 PROCEDURE — 3008F BODY MASS INDEX DOCD: CPT | Performed by: INTERNAL MEDICINE

## 2023-10-05 PROCEDURE — 83036 HEMOGLOBIN GLYCOSYLATED A1C: CPT

## 2023-10-05 PROCEDURE — 82550 ASSAY OF CK (CPK): CPT

## 2023-10-05 PROCEDURE — 84550 ASSAY OF BLOOD/URIC ACID: CPT

## 2023-10-05 PROCEDURE — 85025 COMPLETE CBC W/AUTO DIFF WBC: CPT

## 2023-10-05 PROCEDURE — 80053 COMPREHEN METABOLIC PANEL: CPT

## 2023-10-05 PROCEDURE — 3078F DIAST BP <80 MM HG: CPT | Performed by: INTERNAL MEDICINE

## 2023-10-05 RX ORDER — COLCHICINE 0.6 MG/1
0.6 TABLET ORAL DAILY PRN
Qty: 30 TABLET | Refills: 1 | Status: SHIPPED | OUTPATIENT
Start: 2023-10-05

## 2023-10-05 NOTE — PATIENT INSTRUCTIONS
Check labs   2,  try colchicine 0.6mg a day when the pain starts   3. Return to clinic in 4 weeks.  - 8 mary 12/7 at 2:10 pm

## 2023-10-06 LAB
CCP IGG SERPL-ACNC: 3.4 U/ML (ref 0–6.9)
EST. AVERAGE GLUCOSE BLD GHB EST-MCNC: 146 MG/DL (ref 68–126)
HBA1C MFR BLD: 6.7 % (ref ?–5.7)

## 2023-10-07 ENCOUNTER — TELEPHONE (OUTPATIENT)
Dept: RHEUMATOLOGY | Facility: CLINIC | Age: 63
End: 2023-10-07

## 2023-10-07 NOTE — TELEPHONE ENCOUNTER
90 day prescription request for      colchicine 0.6 MG Oral Tab, Take 1 tablet (0.6 mg total) by mouth daily as needed. , Disp: 30 tablet, Rfl: 1

## 2023-10-07 NOTE — TELEPHONE ENCOUNTER
Request sent to soon. Outpatient Medication Detail     Disp Refills Start End    colchicine 0.6 MG Oral Tab 30 tablet 1 10/5/2023     Sig - Route: Take 1 tablet (0.6 mg total) by mouth daily as needed.  - Oral    Sent to pharmacy as: Colchicine 0.6 MG Oral Tablet    E-Prescribing Status: Receipt confirmed by pharmacy (10/6/2023  6:24 PM CDT)    Prior authorization: Payer Waiting for Response      Associated Diagnoses    Crystal arthropathy of ankle and foot        Pharmacy    [address] Munira Gonzalez [facility] [address] [place] [address], [phone], [phone]

## 2023-10-09 LAB
ALDOLASE: 9.3 U/L
NUCLEAR IGG TITR SER IF: NEGATIVE {TITER}

## 2023-10-20 ENCOUNTER — LAB ENCOUNTER (OUTPATIENT)
Dept: LAB | Age: 63
End: 2023-10-20
Attending: INTERNAL MEDICINE

## 2023-10-20 DIAGNOSIS — R77.9 ELEVATED BLOOD PROTEIN: ICD-10-CM

## 2023-10-20 DIAGNOSIS — R74.8 ELEVATED CK: ICD-10-CM

## 2023-10-20 PROCEDURE — 83516 IMMUNOASSAY NONANTIBODY: CPT

## 2023-10-20 PROCEDURE — 82784 ASSAY IGA/IGD/IGG/IGM EACH: CPT

## 2023-10-20 PROCEDURE — 36415 COLL VENOUS BLD VENIPUNCTURE: CPT

## 2023-10-20 PROCEDURE — 86235 NUCLEAR ANTIGEN ANTIBODY: CPT

## 2023-10-20 PROCEDURE — 84165 PROTEIN E-PHORESIS SERUM: CPT

## 2023-10-20 PROCEDURE — 86334 IMMUNOFIX E-PHORESIS SERUM: CPT

## 2023-10-23 LAB
ALBUMIN SERPL ELPH-MCNC: 4.65 G/DL (ref 3.75–5.21)
ALBUMIN/GLOB SERPL: 1.18 {RATIO} (ref 1–2)
ALPHA1 GLOB SERPL ELPH-MCNC: 0.26 G/DL (ref 0.19–0.46)
ALPHA2 GLOB SERPL ELPH-MCNC: 0.66 G/DL (ref 0.48–1.05)
B-GLOBULIN SERPL ELPH-MCNC: 1.24 G/DL (ref 0.68–1.23)
GAMMA GLOB SERPL ELPH-MCNC: 1.79 G/DL (ref 0.62–1.7)
IGA SERPL-MCNC: 621 MG/DL (ref 70–312)
IGM SERPL-MCNC: 159 MG/DL (ref 43–279)
IMMUNOGLOBULIN PNL SER-MCNC: 1690 MG/DL (ref 791–1643)
PROT SERPL-MCNC: 8.6 G/DL (ref 6.4–8.2)

## 2023-10-27 RX ORDER — EMPAGLIFLOZIN 25 MG/1
1 TABLET, FILM COATED ORAL DAILY
Qty: 90 TABLET | Refills: 2 | OUTPATIENT
Start: 2023-10-27

## 2023-10-27 RX ORDER — LISINOPRIL 5 MG/1
5 TABLET ORAL DAILY
Qty: 90 TABLET | Refills: 2 | OUTPATIENT
Start: 2023-10-27

## 2023-10-27 NOTE — TELEPHONE ENCOUNTER
Current Outpatient Medications:     allopurinol 300 MG Oral Tab, Take 1 tablet (300 mg total) by mouth daily., Disp: 30 tablet, Rfl: 1

## 2023-10-30 NOTE — TELEPHONE ENCOUNTER
LOV: 10/5/23  Wilbert Patiño is a 63 year old male who presents for Patient presents with:  Consult: Arthralgia of both ankles on and off with swelling     Oligaoarthritis - ankles and 1st teoes  - intermittent -   - suggestive of possibly gout with 1st toe involvement - suggestive of gouy or crystal arthropathy  - Check labs to evaluate for inflammatory arthritis and/or connective tissue disease   Rtc in 4-8 weeks   D/w him to try coclhicine when the pain starts and see if he responds to it   If at time he comes in with swelling, can try to aspirate at that time.      2. Neuropathy of bilateral feet - possibly from his diabetes      Summary:  Check labs   2,  try colchicine 0.6mg a day when the pain starts   3. Return to clinic in 4 weeks. - Martina hernandez 12/7 at 2:10 pm         Speedy Gardner MD  10/5/2023  3:53 PM      Future Appointments   Date Time Provider Department Center   12/7/2023  2:10 PM Speedy Gardner MD [facility] [place]     Labs:  Component      Latest Ref Rng 10/5/2023   C-Citrullinated Peptide IgG AB      0.0 - 6.9 U/mL 3.4    MICHAEL SCREEN WITH REFLEX (S)      Negative  Negative    CK      39 - 308 U/L 1,198 (H)    Aldolase      3.3 - 10.3 U/L 9.3       Legend:  (H) High

## 2023-10-31 RX ORDER — ALLOPURINOL 300 MG/1
300 TABLET ORAL DAILY
Qty: 30 TABLET | Refills: 1 | OUTPATIENT
Start: 2023-10-31

## 2023-11-03 LAB
ANTI-EJ: NEGATIVE
ANTI-JO-1: <20 UNITS
ANTI-KU: NEGATIVE
ANTI-MDA-5: <20 UNITS
ANTI-MI-2 AB: NEGATIVE
ANTI-NXP-2: <20 UNITS
ANTI-OJ: NEGATIVE
ANTI-PL-12: NEGATIVE
ANTI-PL-7: NEGATIVE
ANTI-PM/SCL100: <20 UNITS
ANTI-SAE1: <20 UNITS
ANTI-SRP AB: NEGATIVE
ANTI-SSA 52KD IGG: <20 UNITS
ANTI-TIF-1GAMMA: <20 UNITS
ANTI-U1 RNP: <20 UNITS
ANTI-U2 RNP: NEGATIVE
ANTI-U3 RNP: NEGATIVE

## 2023-12-07 ENCOUNTER — OFFICE VISIT (OUTPATIENT)
Dept: RHEUMATOLOGY | Facility: CLINIC | Age: 63
End: 2023-12-07
Payer: MEDICAID

## 2023-12-07 ENCOUNTER — LAB ENCOUNTER (OUTPATIENT)
Dept: LAB | Age: 63
End: 2023-12-07
Attending: INTERNAL MEDICINE
Payer: MEDICAID

## 2023-12-07 VITALS — RESPIRATION RATE: 16 BRPM | WEIGHT: 210 LBS | BODY MASS INDEX: 31.83 KG/M2 | HEIGHT: 68 IN

## 2023-12-07 DIAGNOSIS — Z51.81 MEDICATION MONITORING ENCOUNTER: ICD-10-CM

## 2023-12-07 DIAGNOSIS — M10.9 GOUT, UNSPECIFIED CAUSE, UNSPECIFIED CHRONICITY, UNSPECIFIED SITE: ICD-10-CM

## 2023-12-07 DIAGNOSIS — R74.8 ELEVATED CK: ICD-10-CM

## 2023-12-07 DIAGNOSIS — M79.10 MYALGIA: ICD-10-CM

## 2023-12-07 DIAGNOSIS — M10.9 GOUT, UNSPECIFIED CAUSE, UNSPECIFIED CHRONICITY, UNSPECIFIED SITE: Primary | ICD-10-CM

## 2023-12-07 LAB
ALBUMIN SERPL-MCNC: 4.5 G/DL (ref 3.2–4.8)
ALP LIVER SERPL-CCNC: 71 U/L
ALT SERPL-CCNC: 34 U/L
AST SERPL-CCNC: 34 U/L (ref ?–34)
BILIRUB DIRECT SERPL-MCNC: 0.2 MG/DL (ref ?–0.3)
BILIRUB SERPL-MCNC: 0.8 MG/DL (ref 0.2–1.1)
CK SERPL-CCNC: 461 U/L
ERYTHROCYTE [SEDIMENTATION RATE] IN BLOOD: 34 MM/HR
PROT SERPL-MCNC: 8.2 G/DL (ref 5.7–8.2)
URATE SERPL-MCNC: 6.4 MG/DL

## 2023-12-07 PROCEDURE — 80076 HEPATIC FUNCTION PANEL: CPT

## 2023-12-07 PROCEDURE — 99214 OFFICE O/P EST MOD 30 MIN: CPT | Performed by: INTERNAL MEDICINE

## 2023-12-07 PROCEDURE — 85652 RBC SED RATE AUTOMATED: CPT

## 2023-12-07 PROCEDURE — 36415 COLL VENOUS BLD VENIPUNCTURE: CPT

## 2023-12-07 PROCEDURE — 84550 ASSAY OF BLOOD/URIC ACID: CPT

## 2023-12-07 PROCEDURE — 3008F BODY MASS INDEX DOCD: CPT | Performed by: INTERNAL MEDICINE

## 2023-12-07 PROCEDURE — 82550 ASSAY OF CK (CPK): CPT

## 2023-12-07 RX ORDER — ALLOPURINOL 300 MG/1
300 TABLET ORAL DAILY
Qty: 90 TABLET | Refills: 1 | Status: SHIPPED | OUTPATIENT
Start: 2023-12-07

## 2023-12-15 RX ORDER — BLOOD SUGAR DIAGNOSTIC
1 STRIP MISCELLANEOUS DAILY
Qty: 100 EACH | Refills: 3 | OUTPATIENT
Start: 2023-12-15

## 2023-12-29 RX ORDER — ATORVASTATIN CALCIUM 20 MG/1
20 TABLET, FILM COATED ORAL NIGHTLY
Qty: 90 TABLET | Refills: 3 | OUTPATIENT
Start: 2023-12-29

## 2024-01-25 RX ORDER — LISINOPRIL 5 MG/1
5 TABLET ORAL DAILY
Qty: 90 TABLET | Refills: 2 | Status: SHIPPED | OUTPATIENT
Start: 2024-01-25

## 2024-01-25 RX ORDER — EMPAGLIFLOZIN 25 MG/1
1 TABLET, FILM COATED ORAL DAILY
Qty: 90 TABLET | Refills: 2 | Status: SHIPPED | OUTPATIENT
Start: 2024-01-25

## 2024-01-25 NOTE — TELEPHONE ENCOUNTER
Please review; protocol failed/ has no protocol    Message sent for patient to make an appointment.     Requested Prescriptions   Pending Prescriptions Disp Refills    lisinopril 5 MG Oral Tab 90 tablet 2     Sig: Take 1 tablet (5 mg total) by mouth daily.       Hypertensive Medications Protocol Failed - 1/24/2024  6:30 PM        Failed - In person appointment or virtual visit in the past 6 months     Recent Outpatient Visits              1 month ago Gout, unspecified cause, unspecified chronicity, unspecified site    [facility] [place] Palaniswami, Purani, MD    Office Visit    3 months ago Polyarthralgia    [facility] [place] Palaniswami, Purani, MD    Office Visit    6 months ago Type 2 diabetes mellitus without complication, without long-term current use of insulin ([facility])    [facility] [place] Kandel, Ninel, MD    Office Visit    10 months ago Physical exam, annual    [facility], [place] Kandel, Ninel, MD    Office Visit                      Passed - In person appointment in the past 12 or next 3 months     Recent Outpatient Visits              1 month ago Gout, unspecified cause, unspecified chronicity, unspecified site    [facility] [place] Palaniswami, Purani, MD    Office Visit    3 months ago Polyarthralgia    [facility], [place] Palaniswami, Purani, MD    Office Visit    6 months ago Type 2 diabetes mellitus without complication, without long-term current use of insulin ([facility])    [facility], [place] Kandel, Ninel, MD    Office Visit    10 months ago Physical exam, annual    [facility], [place] Kandel, Ninel, MD    Office Visit                      Passed - Last BP reading less than 140/90     BP Readings from Last 1 Encounters:   10/05/23 110/65                Passed - CMP or BMP in past 6 months     Recent Results (from the past 4392 hour(s))   Comp Metabolic Panel (14)    Collection Time: 10/05/23  4:44 PM   Result Value Ref Range    Glucose 161 (H) 70 - 99 mg/dL    Sodium 139 136 - 145 mmol/L    Potassium 3.7 3.5 - 5.1 mmol/L    Chloride 107 98 - 112 mmol/L    CO2 25.0 21.0 - 32.0 mmol/L    Anion Gap 7 0 - 18 mmol/L    BUN 24 (H) 7 - 18 mg/dL    Creatinine 1.20 0.70 - 1.30 mg/dL    BUN/CREA Ratio 20.0 10.0 - 20.0    Calcium, Total 9.4 8.5 - 10.1 mg/dL    Calculated Osmolality 296 (H) 275 - 295 mOsm/kg    eGFR-Cr 69 >=60 mL/min/1.73m2    ALT 51 16 - 61 U/L    AST 51 (H) 15 - 37 U/L    Alkaline Phosphatase 65 45 - 117 U/L    Bilirubin, Total 0.7 0.1 - 2.0 mg/dL    Total Protein 8.5 (H) 6.4 - 8.2 g/dL    Albumin 3.9 3.4 - 5.0 g/dL    Globulin  4.6 (H) 2.8 - 4.4 g/dL    A/G Ratio 0.8 (L) 1.0 - 2.0    Patient Fasting for CMP? No      *Note: Due to a large number of results and/or encounters for the requested time period, some results have not been displayed. A complete set of results can be found in Results Review.               Passed - EGFRCR or GFRNAA > 50     GFR Evaluation  EGFRCR: 69 , resulted on 10/5/2023            Empagliflozin (JARDIANCE) 25 MG Oral Tab 90 tablet 2     Sig: Take 1 tablet by mouth daily.       Diabetes Medication Protocol Failed - 1/24/2024  6:30 PM        Failed - In person appointment or virtual visit in the past 6 mos or appointment in next 3 mos     Recent Outpatient Visits              1 month ago Gout, unspecified cause, unspecified chronicity, unspecified site    [facility], [address], [place] Speedy Gardner MD    Office Visit    3 months ago Polyarthralgia    [facility], [address], [place] Speedy Gardner MD    Office Visit    6 months ago Type 2 diabetes mellitus without complication, without long-term current use of insulin (HCC)    [facility],  [place] Kandel, Ninel, MD    Office Visit    10 months ago Physical exam, annual    [facility], [place] Kandel, Ninel, MD    Office Visit                      Passed - Last A1C < 7.5 and within past 6 months     Lab Results   Component Value Date    A1C 6.7 (H) 10/05/2023             Passed - EGFRCR or GFRNAA > 50     GFR Evaluation  EGFRCR: 69 , resulted on 10/5/2023          Passed - GFR in the past 12 months           Recent Outpatient Visits              1 month ago Gout, unspecified cause, unspecified chronicity, unspecified site    [facility], [place] Palaniswami, Purani, MD    Office Visit    3 months ago Polyarthralgia    [facility], [place] Palaniswami, Purani, MD    Office Visit    6 months ago Type 2 diabetes mellitus without complication, without long-term current use of insulin (HCC)    [facility], [place] Kandel, Ninel, MD    Office Visit    10 months ago Physical exam, annual    [facility], [place] Kandel, Ninel, MD    Office Visit

## 2024-03-03 RX ORDER — EMPAGLIFLOZIN 25 MG/1
1 TABLET, FILM COATED ORAL DAILY
Qty: 90 TABLET | Refills: 2 | Status: CANCELLED | OUTPATIENT
Start: 2024-03-03

## 2024-03-04 RX ORDER — ALLOPURINOL 300 MG/1
300 TABLET ORAL DAILY
Qty: 90 TABLET | Refills: 1 | Status: SHIPPED | OUTPATIENT
Start: 2024-03-04

## 2024-03-04 NOTE — TELEPHONE ENCOUNTER
LOV: 12/12/23  Last Refilled:#90, 1rf 12/7/23  Labs:Uric Acid 6.4  12/7/23    Summary:  Check labs   2,  cont. Allopurinol 300mg a day   3. Return to clinic in 6months.         Speedy Gardner MD  12/7/2023   2:38 PM  - [place]ed [place]  through Epic      Please advise.

## 2024-03-05 RX ORDER — BLOOD SUGAR DIAGNOSTIC
1 STRIP MISCELLANEOUS DAILY
Qty: 100 EACH | Refills: 3 | Status: SHIPPED | OUTPATIENT
Start: 2024-03-05

## 2024-03-05 RX ORDER — ATORVASTATIN CALCIUM 20 MG/1
20 TABLET, FILM COATED ORAL NIGHTLY
Qty: 90 TABLET | Refills: 3 | Status: SHIPPED | OUTPATIENT
Start: 2024-03-05

## 2024-03-06 NOTE — TELEPHONE ENCOUNTER
Refill passed per [facility] protocol. ePARhart response sent to patient.    Requested Prescriptions   Pending Prescriptions Disp Refills    Glucose Blood (ONETOUCH ULTRA) In Vitro Strip 100 each 3     Si each by Other route daily.       Diabetic Supplies Protocol Passed - 3/3/2024  5:02 PM        Passed - In person appointment or virtual visit in the past 12 mos or appointment in next 3 mos     Recent Outpatient Visits              2 months ago Gout, unspecified cause, unspecified chronicity, unspecified site    [place] Palaniswami, Purani, MD    Office Visit    5 months ago Polyarthralgia    [place] Palaniswami, Purani, MD    Office Visit    8 months ago Type 2 diabetes mellitus without complication, without long-term current use of insulin ([facility])    [facility], [place] Kandel, Ninel, MD    Office Visit    11 months ago Physical exam, annual    [facility], [place] K[place], Ninel, MD    Office Visit                    Refill request too soon.  LR-1/25/24 x 9 months.      Empagliflozin (JARDIANCE) 25 MG Oral Tab 90 tablet 2     Sig: Take 1 tablet by mouth daily.       Diabetes Medication Protocol Failed - 3/3/2024  5:02 PM        Failed - In person appointment or virtual visit in the past 6 mos or appointment in next 3 mos     Recent Outpatient Visits              2 months ago Gout, unspecified cause, unspecified chronicity, unspecified site    [place] Palaniswami, Purani, MD    Office Visit    5 months ago Polyarthralgia    [place] Palaniswami, Purani, MD    Office Visit    8 months ago Type 2 diabetes mellitus without complication, without long-term current use of insulin ([facility])    [facility]    11  months ago Physical exam, annual    [facility] [address], [place] Tessa Ram MD    Office Visit                      Passed - Last A1C < 7.5 and within past 6 months     Lab Results   Component Value Date    A1C 6.7 (H) 10/05/2023             Passed - Microalbumin procedure in past 12 months or taking ACE/ARB        Passed - EGFRCR or GFRNAA > 50     GFR Evaluation  EGFRCR: 69 , resulted on 10/5/2023          Passed - GFR in the past 12 months

## 2024-03-06 NOTE — TELEPHONE ENCOUNTER
Refill passed per [facility] protocol.    Requested Prescriptions   Pending Prescriptions Disp Refills    atorvastatin 20 MG Oral Tab 90 tablet 3     Sig: Take 1 tablet (20 mg total) by mouth nightly.       Cholesterol Medication Protocol Passed - 3/3/2024  5:02 PM        Passed - ALT < 80     Lab Results   Component Value Date    ALT 34 12/07/2023             Passed - ALT resulted within past year        Passed - Lipid panel within past 12 months     Lab Results   Component Value Date    CHOLEST 156 10/05/2023    TRIG 306 (H) 10/05/2023    HDL 42 10/05/2023    LDL 66 10/05/2023    VLDL 46 (H) 10/05/2023    NONHDLC 114 10/05/2023             Passed - In person appointment or virtual visit in the past 12 mos or appointment in next 3 mos     Recent Outpatient Visits              2 months ago Gout, unspecified cause, unspecified chronicity, unspecified site    [facility]    5 months ago [facility]    [facility]    [facility]    8 months ago Type 2 diabetes mellitus without complication, without long-term current use of insulin ([facility])    [facility], [place] K[place], Ninel, MD    Office Visit    11 months ago Physical exam, annual    [facility], [place] K[place], Ninel, MD    Office Visit                    Rx last sent to pharm on 9/20/23 for 1 yr supply. Remaining refills sent to pharm at this time. Knowmia message was already sent to patient stating to schedule f/u appt.  See other refill encounter.      SITagliptin Phosphate 100 MG Oral Tab 90 tablet 3     Sig: Take 1 tablet (100 mg total) by mouth daily.       Diabetes Medication Protocol Failed - 3/3/2024  5:02 PM        Failed - In person appointment or virtual visit in the past 6 mos or appointment in next 3 mos     Recent Outpatient Visits               2 months ago Gout, unspecified cause, unspecified chronicity, unspecified site    [facility], [place] Palaniswami, Purani, MD    Office Visit    5 months ago Polyarthralgia    [facility] [place], [place] Palaniswami, Purani, MD    Office Visit    8 months ago Type 2 diabetes mellitus without complication, without long-term current use of insulin (HCC)    [facility] [place], [place] K[place], Ninel, MD    Office Visit    11 months ago Physical exam, annual    [facility] [place], [place], Ninel, MD    Office Visit                      Passed - Last A1C < 7.5 and within past 6 months     Lab Results   Component Value Date    A1C 6.7 (H) 10/05/2023             Passed - Microalbumin procedure in past 12 months or taking ACE/ARB        Passed - EGFRCR or GFRNAA > 50     GFR Evaluation  EGFRCR: 69 , resulted on 10/5/2023          Passed - GFR in the past 12 months

## 2024-04-24 ENCOUNTER — LAB ENCOUNTER (OUTPATIENT)
Dept: LAB | Age: 64
End: 2024-04-24
Attending: INTERNAL MEDICINE
Payer: MEDICAID

## 2024-04-24 ENCOUNTER — OFFICE VISIT (OUTPATIENT)
Dept: INTERNAL MEDICINE CLINIC | Facility: CLINIC | Age: 64
End: 2024-04-24

## 2024-04-24 VITALS
RESPIRATION RATE: 16 BRPM | HEART RATE: 62 BPM | BODY MASS INDEX: 31.07 KG/M2 | WEIGHT: 205 LBS | HEIGHT: 68 IN | SYSTOLIC BLOOD PRESSURE: 101 MMHG | DIASTOLIC BLOOD PRESSURE: 63 MMHG

## 2024-04-24 DIAGNOSIS — Z12.5 PROSTATE CANCER SCREENING: ICD-10-CM

## 2024-04-24 DIAGNOSIS — Z12.11 COLON CANCER SCREENING: ICD-10-CM

## 2024-04-24 DIAGNOSIS — E11.9 TYPE 2 DIABETES MELLITUS WITHOUT COMPLICATION, WITHOUT LONG-TERM CURRENT USE OF INSULIN (HCC): Primary | ICD-10-CM

## 2024-04-24 DIAGNOSIS — E11.9 TYPE 2 DIABETES MELLITUS WITHOUT COMPLICATION, WITHOUT LONG-TERM CURRENT USE OF INSULIN (HCC): ICD-10-CM

## 2024-04-24 DIAGNOSIS — H53.8 BLURRED VISION, BILATERAL: ICD-10-CM

## 2024-04-24 DIAGNOSIS — M1A.09X0 IDIOPATHIC CHRONIC GOUT OF MULTIPLE SITES WITHOUT TOPHUS: ICD-10-CM

## 2024-04-24 DIAGNOSIS — K43.9 VENTRAL HERNIA WITHOUT OBSTRUCTION OR GANGRENE: ICD-10-CM

## 2024-04-24 LAB
ALBUMIN SERPL-MCNC: 4.4 G/DL (ref 3.2–4.8)
ALBUMIN/GLOB SERPL: 1.3 {RATIO} (ref 1–2)
ALP LIVER SERPL-CCNC: 70 U/L
ALT SERPL-CCNC: 38 U/L
ANION GAP SERPL CALC-SCNC: 7 MMOL/L (ref 0–18)
AST SERPL-CCNC: 37 U/L (ref ?–34)
BASOPHILS # BLD AUTO: 0.04 X10(3) UL (ref 0–0.2)
BASOPHILS NFR BLD AUTO: 0.6 %
BILIRUB SERPL-MCNC: 0.7 MG/DL (ref 0.2–1.1)
BUN BLD-MCNC: 17 MG/DL (ref 9–23)
BUN/CREAT SERPL: 17.5 (ref 10–20)
CALCIUM BLD-MCNC: 9.7 MG/DL (ref 8.7–10.4)
CHLORIDE SERPL-SCNC: 108 MMOL/L (ref 98–112)
CHOLEST SERPL-MCNC: 158 MG/DL (ref ?–200)
CO2 SERPL-SCNC: 26 MMOL/L (ref 21–32)
COMPLEXED PSA SERPL-MCNC: 0.32 NG/ML (ref ?–4)
CREAT BLD-MCNC: 0.97 MG/DL
CREAT UR-SCNC: 69.4 MG/DL
DEPRECATED RDW RBC AUTO: 41.9 FL (ref 35.1–46.3)
EGFRCR SERPLBLD CKD-EPI 2021: 89 ML/MIN/1.73M2 (ref 60–?)
EOSINOPHIL # BLD AUTO: 0.12 X10(3) UL (ref 0–0.7)
EOSINOPHIL NFR BLD AUTO: 1.8 %
ERYTHROCYTE [DISTWIDTH] IN BLOOD BY AUTOMATED COUNT: 12.7 % (ref 11–15)
EST. AVERAGE GLUCOSE BLD GHB EST-MCNC: 146 MG/DL (ref 68–126)
FASTING PATIENT LIPID ANSWER: NO
FASTING STATUS PATIENT QL REPORTED: NO
GLOBULIN PLAS-MCNC: 3.5 G/DL (ref 2.8–4.4)
GLUCOSE BLD-MCNC: 132 MG/DL (ref 70–99)
HBA1C MFR BLD: 6.7 % (ref ?–5.7)
HCT VFR BLD AUTO: 48 %
HDLC SERPL-MCNC: 45 MG/DL (ref 40–59)
HGB BLD-MCNC: 15.7 G/DL
IMM GRANULOCYTES # BLD AUTO: 0.01 X10(3) UL (ref 0–1)
IMM GRANULOCYTES NFR BLD: 0.2 %
LDLC SERPL CALC-MCNC: 57 MG/DL (ref ?–100)
LYMPHOCYTES # BLD AUTO: 2.38 X10(3) UL (ref 1–4)
LYMPHOCYTES NFR BLD AUTO: 36 %
MCH RBC QN AUTO: 29.5 PG (ref 26–34)
MCHC RBC AUTO-ENTMCNC: 32.7 G/DL (ref 31–37)
MCV RBC AUTO: 90.1 FL
MICROALBUMIN UR-MCNC: <0.3 MG/DL
MONOCYTES # BLD AUTO: 0.59 X10(3) UL (ref 0.1–1)
MONOCYTES NFR BLD AUTO: 8.9 %
NEUTROPHILS # BLD AUTO: 3.47 X10 (3) UL (ref 1.5–7.7)
NEUTROPHILS # BLD AUTO: 3.47 X10(3) UL (ref 1.5–7.7)
NEUTROPHILS NFR BLD AUTO: 52.5 %
NONHDLC SERPL-MCNC: 113 MG/DL (ref ?–130)
OSMOLALITY SERPL CALC.SUM OF ELEC: 295 MOSM/KG (ref 275–295)
PLATELET # BLD AUTO: 234 10(3)UL (ref 150–450)
POTASSIUM SERPL-SCNC: 3.9 MMOL/L (ref 3.5–5.1)
PROT SERPL-MCNC: 7.9 G/DL (ref 5.7–8.2)
RBC # BLD AUTO: 5.33 X10(6)UL
SODIUM SERPL-SCNC: 141 MMOL/L (ref 136–145)
TRIGL SERPL-MCNC: 371 MG/DL (ref 30–149)
URATE SERPL-MCNC: 4.3 MG/DL
VLDLC SERPL CALC-MCNC: 54 MG/DL (ref 0–30)
WBC # BLD AUTO: 6.6 X10(3) UL (ref 4–11)

## 2024-04-24 PROCEDURE — 80061 LIPID PANEL: CPT

## 2024-04-24 PROCEDURE — 36415 COLL VENOUS BLD VENIPUNCTURE: CPT

## 2024-04-24 PROCEDURE — 84550 ASSAY OF BLOOD/URIC ACID: CPT

## 2024-04-24 PROCEDURE — 83036 HEMOGLOBIN GLYCOSYLATED A1C: CPT

## 2024-04-24 PROCEDURE — 85025 COMPLETE CBC W/AUTO DIFF WBC: CPT

## 2024-04-24 PROCEDURE — 82570 ASSAY OF URINE CREATININE: CPT

## 2024-04-24 PROCEDURE — 82043 UR ALBUMIN QUANTITATIVE: CPT

## 2024-04-24 PROCEDURE — G8510 SCR DEP NEG, NO PLAN REQD: HCPCS | Performed by: INTERNAL MEDICINE

## 2024-04-24 PROCEDURE — 99214 OFFICE O/P EST MOD 30 MIN: CPT | Performed by: INTERNAL MEDICINE

## 2024-04-24 PROCEDURE — 80053 COMPREHEN METABOLIC PANEL: CPT

## 2024-04-24 NOTE — PROGRESS NOTES
Subjective:     Patient ID: Wilbert Patiño is a 63 year old male.    HPI  Patient states that he has been feeling well, denies chest pain shortness of breath, noticed blurred vision especially when he has to read, at careful slice of the eyes.  He reports no constipation or blood per rectum.  Ready to proceed with colon cancer screening.  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  Patient seen in the presence of his daughter   History/Other:   Review of Systems  Current Outpatient Medications   Medication Sig Dispense Refill    Glucose Blood (ONETOUCH ULTRA) In Vitro Strip 1 each by Other route daily. 100 each 3    atorvastatin 20 MG Oral Tab Take 1 tablet (20 mg total) by mouth nightly. 90 tablet 3    SITagliptin Phosphate 100 MG Oral Tab Take 1 tablet (100 mg total) by mouth daily. 90 tablet 1    allopurinol 300 MG Oral Tab Take 1 tablet (300 mg total) by mouth daily. 90 tablet 1    lisinopril 5 MG Oral Tab Take 1 tablet (5 mg total) by mouth daily. 90 tablet 2    Empagliflozin (JARDIANCE) 25 MG Oral Tab Take 1 tablet by mouth daily. 90 tablet 2    aspirin 81 MG Oral Chew Tab Chew 1 tablet (81 mg total) by mouth daily. 1 tablet 0    NON FORMULARY Take 1 tablet by mouth daily with breakfast. Aiglimet 15mg/1000 mg daily in am       Allergies:No Known Allergies    Past Medical History:    Diabetes (HCC)      History reviewed. No pertinent surgical history.   No family history on file.   Social History:   Social History     Socioeconomic History    Marital status:    Tobacco Use    Smoking status: Never    Smokeless tobacco: Never   Vaping Use    Vaping status: Never Used   Substance and Sexual Activity    Alcohol use: Yes     Comment: ocassional    Drug use: Never        Objective:   Physical Exam    Assessment & Plan:   1. Type 2 diabetes mellitus without complication, without long-term current use of insulin (HCC)    2. Prostate cancer screening    3. Blurred  vision, bilateral    4. Colon cancer screening    5. Idiopathic chronic gout of multiple sites without tophus    6. Ventral hernia without obstruction or gangrene        Orders Placed This Encounter   Procedures    CBC With Differential With Platelet    Comp Metabolic Panel (14)    Lipid Panel    Hemoglobin A1C    Microalb/Creat Ratio, Random Urine    PSA Total, Screen    Uric Acid       Meds This Visit:  Requested Prescriptions      No prescriptions requested or ordered in this encounter       Imaging & Referrals:  OPHTHALMOLOGY - INTERNAL  GASTRO - INTERNAL

## 2024-04-28 NOTE — PROGRESS NOTES
Subjective:     Patient ID: Wilbert Patiño is a 63 year old male.  Presents for follow-up of several medical conditions    HPI  Patient states that he has been feeling well, denies chest pain shortness of breath, noticed blurred vision especially when he has to read, at careful slice of the eyes.  He reports no constipation or blood per rectum.  Ready to proceed with colon cancer screening.  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  Patient seen in the presence of his daughter     Current Outpatient Medications   Medication Sig Dispense Refill    Glucose Blood (ONETOUCH ULTRA) In Vitro Strip 1 each by Other route daily. 100 each 3    atorvastatin 20 MG Oral Tab Take 1 tablet (20 mg total) by mouth nightly. 90 tablet 3    SITagliptin Phosphate 100 MG Oral Tab Take 1 tablet (100 mg total) by mouth daily. 90 tablet 1    allopurinol 300 MG Oral Tab Take 1 tablet (300 mg total) by mouth daily. 90 tablet 1    lisinopril 5 MG Oral Tab Take 1 tablet (5 mg total) by mouth daily. 90 tablet 2    Empagliflozin (JARDIANCE) 25 MG Oral Tab Take 1 tablet by mouth daily. 90 tablet 2    aspirin 81 MG Oral Chew Tab Chew 1 tablet (81 mg total) by mouth daily. 1 tablet 0    NON FORMULARY Take 1 tablet by mouth daily with breakfast. Aiglimet 15mg/1000 mg daily in am       Allergies:No Known Allergies    Past Medical History:    Diabetes (HCC)      History reviewed. No pertinent surgical history.   No family history on file.   Social History:   Social History     Socioeconomic History    Marital status:    Tobacco Use    Smoking status: Never    Smokeless tobacco: Never   Vaping Use    Vaping status: Never Used   Substance and Sexual Activity    Alcohol use: Yes     Comment: ocassional    Drug use: Never        /63 (BP Location: Left arm, Patient Position: Sitting, Cuff Size: large)   Pulse 62   Resp 16   Ht 5' 8\" (1.727 m)   Wt 205 lb (93 kg)   BMI 31.17 kg/m²    Physical  Exam  Constitutional:       Appearance: Normal appearance.   HENT:      Head: Normocephalic and atraumatic.   Eyes:      General: No scleral icterus.     Extraocular Movements: Extraocular movements intact.      Conjunctiva/sclera: Conjunctivae normal.      Pupils: Pupils are equal, round, and reactive to light.   Cardiovascular:      Rate and Rhythm: Normal rate.      Heart sounds: No murmur heard.  Pulmonary:      Effort: Pulmonary effort is normal.      Breath sounds: No wheezing or rhonchi.   Abdominal:      General: Bowel sounds are normal.      Palpations: Abdomen is soft. There is no mass.      Tenderness: There is no guarding or rebound.      Hernia: A hernia is present. Hernia is present in the ventral area.   Musculoskeletal:         General: Normal range of motion.      Cervical back: Normal range of motion and neck supple.      Right lower leg: No edema.      Left lower leg: No edema.   Feet:      Comments: Bilateral barefoot skin diabetic exam is normal, visualized feet and the appearance is normal.  Bilateral monofilament/sensation of both feet is normal.  Pulsation pedal pulse exam of both lower legs/feet is normal as well.       Lymphadenopathy:      Cervical: No cervical adenopathy.   Skin:     General: Skin is warm.      Color[place]: Skin is not jaundiced.   Neurological:      General: No focal deficit present.      Mental Status: He is alert and oriented to person, place, and time. Mental status is at baseline.   Psychiatric:         Mood and Affect: Mood normal.         Behavior: Behavior normal.         Thought Content: Thought content normal.         Assessment & Plan:   1. Type 2 diabetes mellitus without complication, without long-term current use of insulin (HCC) controlled continue current medications monitor blood sugar once a day at different times of the day   2. Prostate cancer screening check PSA  See see ophthalmology   3. Blurred vision, bilateral see ophthalmology, needs diabetic eye  exam   4. Colon cancer screening colon cancer to gastroenterology for colonoscopy   5. Idiopathic chronic gout of multiple sites without tophus check uric acid, continue allopurinol   6. Ventral hernia without obstruction or gangrene        Orders Placed This Encounter   Procedures    CBC With Differential With Platelet    Comp Metabolic Panel (14)    Lipid Panel    Hemoglobin A1C    Microalb/Creat Ratio, Random Urine    PSA Total, Screen    Uric Acid     Advised to do blood test every 6 months  Meds This Visit:  Requested Prescriptions      No prescriptions requested or ordered in this encounter       Imaging & Referrals:  OPHTHALMOLOGY - INTERNAL  GASTRO - INTERNAL     Follow-up in 6 months

## 2024-06-06 RX ORDER — EMPAGLIFLOZIN 25 MG/1
1 TABLET, FILM COATED ORAL DAILY
Qty: 90 TABLET | Refills: 3 | Status: SHIPPED | OUTPATIENT
Start: 2024-06-06

## 2024-06-06 NOTE — TELEPHONE ENCOUNTER
Refill passed per [facility] protocol.  Requested Prescriptions   Pending Prescriptions Disp Refills    SITagliptin Phosphate 100 MG Oral Tab 90 tablet 1     Sig: Take 1 tablet (100 mg total) by mouth daily.       Diabetes Medication Protocol Passed - 6/3/2024  9:10 AM        Passed - Last A1C < 7.5 and within past 6 months     Lab Results   Component Value Date    A1C 6.7 (H) 04/24/2024             Passed - In person appointment or virtual visit in the past 6 mos or appointment in next 3 mos     Recent Outpatient Visits              1 month ago Type 2 diabetes mellitus without complication, without long-term current use of insulin ([facility])    [facility], [facility], [place], Ninel, MD    Office Visit    6 months ago Gout, unspecified cause, unspecified chronicity, unspecified site    [facility], [place] Palaniswami, Purani, MD    Office Visit    8 months ago Polyarthralgia    [facility], [place] Palaniswami, Purani, MD    Office Visit    11 months ago Type 2 diabetes mellitus without complication, without long-term current use of insulin ([facility])    [facility], [facility], [place], Ninel, MD    Office Visit    1 year ago Physical exam, annual    [facility], [place], Ninel, MD    Office Visit                      Passed - Microalbumin procedure in past 12 months or taking ACE/ARB        Passed - EGFRCR or GFRNAA > 50     GFR Evaluation  EGFRCR: 89 , resulted on 4/24/2024          Passed - GFR in the past 12 months           Recent Outpatient Visits              1 month ago Type 2 diabetes mellitus without complication, without long-term current use of insulin ([facility])    [facility], [facility], [place], Ninel, MD    Office Visit    6 months ago Gout, unspecified cause, unspecified chronicity, unspecified site    [facility]    [facility]    8 months ago Polyarthralgia    [facility], [place] Pal[place]ami, Purani, MD    Office Visit    11 months ago Type 2 diabetes mellitus without complication, without long-term current use of insulin (HCC)    [facility] [place] [facility]    Office Visit    1 year ago Physical exam, annual    [facility], [place], N[place], MD    Office Visit

## 2024-06-06 NOTE — TELEPHONE ENCOUNTER
Refill passed per [facility] protocol.  Requested Prescriptions   Pending Prescriptions Disp Refills    Empagliflozin (JARDIANCE) 25 MG Oral Tab 90 tablet 2     Sig: Take 1 tablet by mouth daily.       Diabetes Medication Protocol Passed - 6/3/2024  9:09 AM        Passed - Last A1C < 7.5 and within past 6 months     Lab Results   Component Value Date    A1C 6.7 (H) 04/24/2024             Passed - In person appointment or virtual visit in the past 6 mos or appointment in next 3 mos     Recent Outpatient Visits              1 month ago Type 2 diabetes mellitus without complication, without long-term current use of insulin ([facility])    [facility] [place]    Office Visit    6 months ago Gout, unspecified cause, unspecified chronicity, unspecified site    [facility], [place], Purani, MD    Office Visit    8 months ago Polyarthralgia    [facility], [place]ami, Purani, MD    Office Visit    11 months ago Type 2 diabetes mellitus without complication, without long-term current use of insulin ([facility])    [facility], [facility], [place], Ninel, MD    Office Visit    1 year ago Physical exam, annual    [facility], [place], Ninel, MD    Office Visit                      Passed - Microalbumin procedure in past 12 months or taking ACE/ARB        Passed - EGFRCR or GFRNAA > 50     GFR Evaluation  EGFRCR: 89 , resulted on 4/24/2024          Passed - GFR in the past 12 months           Recent Outpatient Visits              1 month ago Type 2 diabetes mellitus without complication, without long-term current use of insulin ([facility])    [facility] [place], Ninel, MD    Office Visit    6 months ago Gout, unspecified cause, unspecified chronicity, unspecified site    [facility]  [address], [place]an[place]ami, Purani, MD    Office Visit    8 months ago Polyarthralgia    [facility], [facility], [place] Palan[place]wami, Purani, MD    Office Visit    11 months ago Type 2 diabetes mellitus without complication, without long-term current use of insulin (HCC)    [facility], [facility], [place], Ninel, MD    Office Visit    1 year ago Physical exam, annual    [facility], [place], Ninel, MD    Office Visit

## 2024-07-02 RX ORDER — ALLOPURINOL 300 MG/1
300 TABLET ORAL DAILY
Qty: 90 TABLET | Refills: 1 | OUTPATIENT
Start: 2024-07-02

## 2024-07-05 RX ORDER — ATORVASTATIN CALCIUM 20 MG/1
20 TABLET, FILM COATED ORAL NIGHTLY
Qty: 90 TABLET | Refills: 3 | OUTPATIENT
Start: 2024-07-05

## 2024-07-05 RX ORDER — BLOOD SUGAR DIAGNOSTIC
1 STRIP MISCELLANEOUS DAILY
Qty: 100 EACH | Refills: 3 | OUTPATIENT
Start: 2024-07-05

## 2024-07-05 RX ORDER — EMPAGLIFLOZIN 25 MG/1
1 TABLET, FILM COATED ORAL DAILY
Qty: 90 TABLET | Refills: 3 | OUTPATIENT
Start: 2024-07-05

## 2024-07-05 RX ORDER — LISINOPRIL 5 MG/1
5 TABLET ORAL DAILY
Qty: 90 TABLET | Refills: 2 | OUTPATIENT
Start: 2024-07-05

## 2024-07-05 NOTE — TELEPHONE ENCOUNTER
Sitagliptin 100m year supply sent on 2024 to [facility] in [place].     Atorvastatin 20mg, Onetouch test stirps: 1 year supply sent to [facility] on 2024.

## 2024-07-05 NOTE — TELEPHONE ENCOUNTER
Jardiance 25m year supply sent to [facility] on 2024.    Lisinopril 5m month supply sent to [facility] in [place] on 2024

## 2024-07-16 ENCOUNTER — TELEPHONE (OUTPATIENT)
Dept: INTERNAL MEDICINE CLINIC | Facility: CLINIC | Age: 64
End: 2024-07-16

## 2024-07-16 DIAGNOSIS — H53.8 BLURRY VISION: Primary | ICD-10-CM

## 2024-07-16 NOTE — TELEPHONE ENCOUNTER
Patient is requesting referral.     Name of specialist and specialty department : Dr.Andrew Shaffer   Reason for visit with the specialist: follow up blurry vision   Address of the specialist office:[place]   N/a          [facility] informed patient the turnaround time for referral is 5-7 business days.  Patient was informed to check their [employer] account for referral status.           * patient has appointment on 7/19/24

## 2024-07-17 NOTE — TELEPHONE ENCOUNTER
Dr. Ram,     Patient called requesting referral to Dr. Shaffer.     Pended referral please review diagnosis and sign off if you agree.    Thank you.  Nissa Feldman  [facility]

## 2024-07-19 ENCOUNTER — MED REC SCAN ONLY (OUTPATIENT)
Dept: INTERNAL MEDICINE CLINIC | Facility: CLINIC | Age: 64
End: 2024-07-19

## 2024-10-08 RX ORDER — ATORVASTATIN CALCIUM 20 MG/1
20 TABLET, FILM COATED ORAL NIGHTLY
Qty: 90 TABLET | Refills: 3 | OUTPATIENT
Start: 2024-10-08

## 2024-10-08 RX ORDER — BLOOD SUGAR DIAGNOSTIC
1 STRIP MISCELLANEOUS DAILY
Qty: 100 EACH | Refills: 3 | OUTPATIENT
Start: 2024-10-08

## 2024-10-08 RX ORDER — EMPAGLIFLOZIN 25 MG/1
1 TABLET, FILM COATED ORAL DAILY
Qty: 90 TABLET | Refills: 3 | OUTPATIENT
Start: 2024-10-08

## 2024-10-08 NOTE — TELEPHONE ENCOUNTER
Empagliflozin (JARDIANCE) 25 MG Oral Tab 90 tablet 3 6/6/2024 --    Sig - Route: Take 1 tablet by mouth daily. - Oral    Sent to pharmacy as: Jardiance 25 MG Oral Tablet (Empagliflozin)    E-Prescribing Status: Receipt confirmed by pharmacy (6/6/2024 12:41 PM CDT)      Pharmacy    [id number] - [address] AT [facility], [phone], [phone]

## 2025-01-07 NOTE — TELEPHONE ENCOUNTER
Patient needs a refill on       lisinopril 5 MG Oral Tab, Take 1 tablet (5 mg total) by mouth daily., Disp: 90 tablet, Rfl: 2

## 2025-01-10 RX ORDER — LISINOPRIL 5 MG/1
5 TABLET ORAL DAILY
Qty: 90 TABLET | Refills: 0 | Status: SHIPPED | OUTPATIENT
Start: 2025-01-10

## 2025-01-10 NOTE — TELEPHONE ENCOUNTER
To reception staff, pls call patient for appt.   Also BandAppt message sent to patient   Thanks       Refill Passed Per Protocol    Requested Prescriptions   Pending Prescriptions Disp Refills    lisinopril 5 MG Oral Tab 90 tablet 3     Sig: Take 1 tablet (5 mg total) by mouth daily.       Hypertension Medications Protocol Passed - 1/10/2025  4:49 PM        Passed - CMP or BMP in past 12 months        Passed - Last BP reading less than 140/90     BP Readings from Last 1 Encounters:   04/24/24 101/63               Passed - In person appointment or virtual visit in the past 12 mos or appointment in next 3 mos     Recent Outpatient Visits              8 months ago Type 2 diabetes mellitus without complication, without long-term current use of insulin ([facility])    [facility], [place], N[place], MD    Office Visit    1 year ago Gout, unspecified cause, unspecified chronicity, unspecified site    [facility], [place]ami, Purani, MD    Office Visit    1 year ago Polyarthralgia    [facility] [place]    Office Visit    1 year ago Type 2 diabetes mellitus without complication, without long-term current use of insulin ([facility])    [facility], [place], Ninel, MD    Office Visit    1 year ago Physical exam, annual    [facility], [place], Ninel, MD    Office Visit                      Passed - EGFRCR or GFRNAA > 50     GFR Evaluation  EGFRCR: 89 , resulted on 4/24/2024          Passed - Medication is active on med list               Recent Outpatient Visits              8 months ago Type 2 diabetes mellitus without complication, without long-term current use of insulin ([facility])    [facility], [place], Ninel, MD    Office Visit    1 year ago Gout, unspecified cause, unspecified chronicity,  unspecified site    [facility]    [facility]    1 year ago [facility]    [facility]    Office Visit    1 year ago Type 2 diabetes mellitus without complication, without long-term current use of insulin (HCC)    [facility]    [facility]    1 year ago Physical exam, annual    [facility] [place], [place], MD    Office Visit

## 2025-01-12 NOTE — TELEPHONE ENCOUNTER
Unable to leave voicemail, phone keeps ringing with no response. Please offer appointment to patient when he calls back and obtain new insurance information.

## 2025-05-28 ENCOUNTER — OFFICE VISIT (OUTPATIENT)
Dept: INTERNAL MEDICINE CLINIC | Facility: CLINIC | Age: 65
End: 2025-05-28
Payer: COMMERCIAL

## 2025-05-28 ENCOUNTER — LAB ENCOUNTER (OUTPATIENT)
Dept: LAB | Age: 65
End: 2025-05-28
Attending: INTERNAL MEDICINE
Payer: MEDICAID

## 2025-05-28 VITALS
BODY MASS INDEX: 29.4 KG/M2 | DIASTOLIC BLOOD PRESSURE: 74 MMHG | HEART RATE: 58 BPM | SYSTOLIC BLOOD PRESSURE: 121 MMHG | WEIGHT: 194 LBS | HEIGHT: 68 IN

## 2025-05-28 DIAGNOSIS — Z00.00 PHYSICAL EXAM, ROUTINE: ICD-10-CM

## 2025-05-28 DIAGNOSIS — G62.9 NEUROPATHY: ICD-10-CM

## 2025-05-28 DIAGNOSIS — E11.9 TYPE 2 DIABETES MELLITUS WITHOUT COMPLICATION, WITHOUT LONG-TERM CURRENT USE OF INSULIN (HCC): ICD-10-CM

## 2025-05-28 DIAGNOSIS — R20.0 NUMBNESS AND TINGLING OF BOTH FEET: ICD-10-CM

## 2025-05-28 DIAGNOSIS — M1A.09X0 IDIOPATHIC CHRONIC GOUT OF MULTIPLE SITES WITHOUT TOPHUS: ICD-10-CM

## 2025-05-28 DIAGNOSIS — Z00.00 PHYSICAL EXAM, ROUTINE: Primary | ICD-10-CM

## 2025-05-28 DIAGNOSIS — R89.9 ABNORMAL LABORATORY TEST: ICD-10-CM

## 2025-05-28 DIAGNOSIS — Z12.11 COLON CANCER SCREENING: ICD-10-CM

## 2025-05-28 DIAGNOSIS — R20.2 NUMBNESS AND TINGLING OF BOTH FEET: ICD-10-CM

## 2025-05-28 LAB
ALBUMIN SERPL-MCNC: 4.6 G/DL (ref 3.2–4.8)
ALBUMIN/GLOB SERPL: 1.4 {RATIO} (ref 1–2)
ALP LIVER SERPL-CCNC: 59 U/L (ref 45–117)
ALT SERPL-CCNC: 50 U/L (ref 10–49)
ANION GAP SERPL CALC-SCNC: 6 MMOL/L (ref 0–18)
AST SERPL-CCNC: 48 U/L (ref ?–34)
BASOPHILS # BLD AUTO: 0.03 X10(3) UL (ref 0–0.2)
BASOPHILS NFR BLD AUTO: 0.6 %
BILIRUB SERPL-MCNC: 0.6 MG/DL (ref 0.2–1.1)
BUN BLD-MCNC: 20 MG/DL (ref 9–23)
BUN/CREAT SERPL: 20.2 (ref 10–20)
CALCIUM BLD-MCNC: 9.9 MG/DL (ref 8.7–10.4)
CHLORIDE SERPL-SCNC: 105 MMOL/L (ref 98–112)
CHOLEST SERPL-MCNC: 245 MG/DL (ref ?–200)
CO2 SERPL-SCNC: 27 MMOL/L (ref 21–32)
CREAT BLD-MCNC: 0.99 MG/DL (ref 0.7–1.3)
CRP SERPL-MCNC: <0.4 MG/DL (ref ?–1)
DEPRECATED RDW RBC AUTO: 39.3 FL (ref 35.1–46.3)
EGFRCR SERPLBLD CKD-EPI 2021: 86 ML/MIN/1.73M2 (ref 60–?)
EOSINOPHIL # BLD AUTO: 0.08 X10(3) UL (ref 0–0.7)
EOSINOPHIL NFR BLD AUTO: 1.7 %
ERYTHROCYTE [DISTWIDTH] IN BLOOD BY AUTOMATED COUNT: 12.2 % (ref 11–15)
ERYTHROCYTE [SEDIMENTATION RATE] IN BLOOD: 46 MM/HR (ref 0–20)
EST. AVERAGE GLUCOSE BLD GHB EST-MCNC: 128 MG/DL (ref 68–126)
FASTING PATIENT LIPID ANSWER: NO
FASTING STATUS PATIENT QL REPORTED: NO
GLOBULIN PLAS-MCNC: 3.4 G/DL (ref 2–3.5)
GLUCOSE BLD-MCNC: 108 MG/DL (ref 70–99)
HBA1C MFR BLD: 6.1 % (ref ?–5.7)
HCT VFR BLD AUTO: 47.4 % (ref 39–53)
HDLC SERPL-MCNC: 52 MG/DL (ref 40–59)
HGB BLD-MCNC: 15.8 G/DL (ref 13–17.5)
IMM GRANULOCYTES # BLD AUTO: 0.01 X10(3) UL (ref 0–1)
IMM GRANULOCYTES NFR BLD: 0.2 %
LDLC SERPL CALC-MCNC: 118 MG/DL (ref ?–100)
LYMPHOCYTES # BLD AUTO: 1.68 X10(3) UL (ref 1–4)
LYMPHOCYTES NFR BLD AUTO: 36.1 %
MCH RBC QN AUTO: 29.2 PG (ref 26–34)
MCHC RBC AUTO-ENTMCNC: 33.3 G/DL (ref 31–37)
MCV RBC AUTO: 87.6 FL (ref 80–100)
MONOCYTES # BLD AUTO: 0.51 X10(3) UL (ref 0.1–1)
MONOCYTES NFR BLD AUTO: 10.9 %
NEUTROPHILS # BLD AUTO: 2.35 X10 (3) UL (ref 1.5–7.7)
NEUTROPHILS # BLD AUTO: 2.35 X10(3) UL (ref 1.5–7.7)
NEUTROPHILS NFR BLD AUTO: 50.5 %
NONHDLC SERPL-MCNC: 193 MG/DL (ref ?–130)
OSMOLALITY SERPL CALC.SUM OF ELEC: 289 MOSM/KG (ref 275–295)
PLATELET # BLD AUTO: 230 10(3)UL (ref 150–450)
POTASSIUM SERPL-SCNC: 3.9 MMOL/L (ref 3.5–5.1)
PROT SERPL-MCNC: 8 G/DL (ref 5.7–8.2)
RBC # BLD AUTO: 5.41 X10(6)UL (ref 4.3–5.7)
RHEUMATOID FACT SERPL-ACNC: 7.1 IU/ML (ref ?–14)
SODIUM SERPL-SCNC: 138 MMOL/L (ref 136–145)
TRIGL SERPL-MCNC: 427 MG/DL (ref 30–149)
TSI SER-ACNC: 1.9 UIU/ML (ref 0.55–4.78)
URATE SERPL-MCNC: 5.7 MG/DL (ref 3.7–9.2)
VIT B12 SERPL-MCNC: >2000 PG/ML (ref 211–911)
VLDLC SERPL CALC-MCNC: 77 MG/DL (ref 0–30)
WBC # BLD AUTO: 4.7 X10(3) UL (ref 4–11)

## 2025-05-28 PROCEDURE — 85025 COMPLETE CBC W/AUTO DIFF WBC: CPT

## 2025-05-28 PROCEDURE — 85652 RBC SED RATE AUTOMATED: CPT

## 2025-05-28 PROCEDURE — 83036 HEMOGLOBIN GLYCOSYLATED A1C: CPT

## 2025-05-28 PROCEDURE — 80061 LIPID PANEL: CPT

## 2025-05-28 PROCEDURE — 80053 COMPREHEN METABOLIC PANEL: CPT

## 2025-05-28 PROCEDURE — 36415 COLL VENOUS BLD VENIPUNCTURE: CPT

## 2025-05-28 PROCEDURE — 86431 RHEUMATOID FACTOR QUANT: CPT

## 2025-05-28 PROCEDURE — 84443 ASSAY THYROID STIM HORMONE: CPT

## 2025-05-28 PROCEDURE — 86140 C-REACTIVE PROTEIN: CPT

## 2025-05-28 PROCEDURE — 82607 VITAMIN B-12: CPT

## 2025-05-28 PROCEDURE — 84550 ASSAY OF BLOOD/URIC ACID: CPT

## 2025-05-28 NOTE — PROGRESS NOTES
Subjective:     Patient ID: Wilbert Patiño is a 64 year old male.  Presents for physical exam  History taken in Syriac, I speak fluent Syriac  HPI  Patient reports that overall he has been feeling fair ,last couple weeks experiencing stiffness in swelling in the joints of both hands.  History of gout taking allopurinol on regular basis, chronic tingling numbness in the toes of both feet up to mid foot present for couple years.  He checks blood sugar sugar at home states it is running normal today took only half a pill of medication because sugar was 80in the morning.      Review of Systems  ROS:     Constitutional:  Negative for decreased activity, fever, irritability and lethargy  Cardiovascular:  Negative for chest pain and irregular heartbeat/palpitations  Respiratory:  Negative for cough, dyspnea and wheezing.  Eyes:  Negative for eye discharge and vision loss  Endocrine:  Negative for polydipsia and polyphagia  Integumentary:  Negative for pruritus and rash  Neurological:  Negative for gait disturbance, headache or dizziness  Psychiatric:  Negative for inappropriate interaction and psychiatric symptoms  Current Medications[1]  Allergies:Allergies[2]    Past Medical History[3]   Past Surgical History[4]   Family History[5]   Social History: Short Social Hx on File[6]     /74 (BP Location: Left arm, Patient Position: Sitting, Cuff Size: adult)   Pulse 58   Ht 5' 8\" (1.727 m)   Wt 194 lb (88 kg)   BMI 29.50 kg/m²    Physical Exam  Constitutional:       Appearance: Normal appearance.   HENT:      Head: Normocephalic and atraumatic.   Eyes:      General: No scleral icterus.     Extraocular Movements: Extraocular movements intact.      Conjunctiva/sclera: Conjunctivae normal.      Pupils: Pupils are equal, round, and reactive to light.   Cardiovascular:      Rate and Rhythm: Normal rate and regular rhythm.      Heart sounds: No murmur heard.     No gallop.   Pulmonary:      Effort: Pulmonary effort is  normal. No respiratory distress.   Abdominal:      General: Bowel sounds are normal.      Palpations: Abdomen is soft. There is no mass.      Tenderness: There is no abdominal tenderness. There is no guarding or rebound.   Musculoskeletal:         General: Normal range of motion.      Right hand: Swelling and tenderness present.      Left hand: Swelling and tenderness present.      Cervical back: Normal range of motion and neck supple.      Right lower leg: No edema.      Left lower leg: No edema.      Comments: Decreased handgrip right more than left   Feet:      Comments: Bilateral barefoot skin diabetic exam is normal, visualized feet and the appearance is normal.  Bilateral monofilament/sensation of both feet is normal.  Pulsation pedal pulse exam of both lower legs/feet is normal as well.       Lymphadenopathy:      Cervical: No cervical adenopathy.   Skin:     General: Skin is warm.      Coloration: Skin is not jaundiced.   Neurological:      General: No focal deficit present.      Mental Status: He is alert and oriented to person, place, and time. Mental status is at baseline.   Psychiatric:         Mood and Affect: Mood normal.         Behavior: Behavior normal.         Thought Content: Thought content normal.         Judgment: Judgment normal.         Assessment & Plan:   1. Physical exam, routine    2. Type 2 diabetes mellitus without complication, without long-term current use of insulin (HCC) controlled, continue current medication monitor blood sugar, see ophthalmology for diabetic eye exam urine microalbumin ordered   3. Neuropathy etiology not clear will check inflammatory markers vitamin B12 level EMG both lower extremities ordered   4. Idiopathic chronic gout of multiple sites without tophus possible flareup affecting him joints we will treat with prednisone, see rheumatology continue allopurinol for now referred patient to see gastroenterology   5. Colon cancer screening ordered please do fit test            6. Numbness and tingling of both feet see podiatry       Orders Placed This Encounter   Procedures    CBC With Differential With Platelet    Comp Metabolic Panel (14)    Lipid Panel    Hemoglobin A1C    TSH W Reflex To Free T4    Vitamin B12    Sed Rate, Westergren (Automated)    C-Reactive Protein    Rheumatoid Arthritis Factor    Uric Acid    Occult Blood, Fecal, FIT Immunoassay ([employer])       Meds This Visit:  Requested Prescriptions      No prescriptions requested or ordered in this encounter       Imaging & Referrals:  OPHTHALMOLOGY - INTERNAL  RHEUMATOLOGY - INTERNAL  GASTRO - INTERNAL   Follow-up in 3 to 6 months sooner if needed         [1]   Current Outpatient Medications   Medication Sig Dispense Refill    lisinopril 5 MG Oral Tab Take 1 tablet (5 mg total) by mouth daily. 90 tablet 0    Empagliflozin (JARDIANCE) 25 MG Oral Tab Take 1 tablet by mouth daily. 90 tablet 3    SITagliptin Phosphate 100 MG Oral Tab Take 1 tablet (100 mg total) by mouth daily. 90 tablet 3    Glucose Blood (ONETOUCH ULTRA) In Vitro Strip 1 each by Other route daily. 100 each 3    atorvastatin 20 MG Oral Tab Take 1 tablet (20 mg total) by mouth nightly. 90 tablet 3    allopurinol 300 MG Oral Tab Take 1 tablet (300 mg total) by mouth daily. 90 tablet 1    aspirin 81 MG Oral Chew Tab Chew 1 tablet (81 mg total) by mouth daily. 1 tablet 0    NON FORMULARY Take 1 tablet by mouth daily with breakfast. Aiglimet 15mg/1000 mg daily in am     [2] No Known Allergies  [3]   Past Medical History:   Diabetes (HCC)   [4] History reviewed. No pertinent surgical history.  [5] History reviewed. No pertinent family history.  [6]   Social History  Socioeconomic History    Marital status:    Tobacco Use    Smoking status: Never     Passive exposure: Never    Smokeless tobacco: Never   Vaping Use    Vaping status: Never Used   Substance and Sexual Activity    Alcohol use: Yes     Comment: ocassional    Drug use: Never

## 2025-05-29 PROBLEM — M1A.09X0 IDIOPATHIC CHRONIC GOUT OF MULTIPLE SITES WITHOUT TOPHUS: Status: ACTIVE | Noted: 2025-05-29

## 2025-07-12 NOTE — TELEPHONE ENCOUNTER
Refill request    Current Medications[1]      Empagliflozin (JARDIANCE) 25 MG Oral Tab Take 1 tablet by mouth daily. 90 tablet 3          [1]   Current Outpatient Medications   Medication Sig Dispense Refill    predniSONE 10 MG Oral Tab Take 30 mg PO daily x 3days, Take 20 mg PO daily x 3days, Take 10 mg PO daily x 3days 18 tablet 0    lisinopril 5 MG Oral Tab Take 1 tablet (5 mg total) by mouth daily. 90 tablet 0    Empagliflozin (JARDIANCE) 25 MG Oral Tab Take 1 tablet by mouth daily. 90 tablet 3    SITagliptin Phosphate 100 MG Oral Tab Take 1 tablet (100 mg total) by mouth daily. 90 tablet 3    Glucose Blood (ONETOUCH ULTRA) In Vitro Strip 1 each by Other route daily. 100 each 3    atorvastatin 20 MG Oral Tab Take 1 tablet (20 mg total) by mouth nightly. 90 tablet 3    allopurinol 300 MG Oral Tab Take 1 tablet (300 mg total) by mouth daily. 90 tablet 1    aspirin 81 MG Oral Chew Tab Chew 1 tablet (81 mg total) by mouth daily. 1 tablet 0    NON FORMULARY Take 1 tablet by mouth daily with breakfast. Aiglimet 15mg/1000 mg daily in am

## 2025-07-16 RX ORDER — BLOOD SUGAR DIAGNOSTIC
STRIP MISCELLANEOUS DAILY
Qty: 100 STRIP | Refills: 3 | Status: SHIPPED | OUTPATIENT
Start: 2025-07-16

## 2025-07-17 RX ORDER — LISINOPRIL 5 MG/1
5 TABLET ORAL DAILY
Qty: 90 TABLET | Refills: 3 | Status: SHIPPED | OUTPATIENT
Start: 2025-07-17